# Patient Record
Sex: FEMALE | Race: WHITE | Employment: FULL TIME | URBAN - METROPOLITAN AREA
[De-identification: names, ages, dates, MRNs, and addresses within clinical notes are randomized per-mention and may not be internally consistent; named-entity substitution may affect disease eponyms.]

---

## 2017-01-11 ENCOUNTER — OFFICE VISIT (OUTPATIENT)
Dept: FAMILY MEDICINE CLINIC | Age: 72
End: 2017-01-11

## 2017-01-11 VITALS
TEMPERATURE: 97.5 F | BODY MASS INDEX: 47.01 KG/M2 | WEIGHT: 249 LBS | RESPIRATION RATE: 12 BRPM | HEIGHT: 61 IN | DIASTOLIC BLOOD PRESSURE: 79 MMHG | HEART RATE: 55 BPM | SYSTOLIC BLOOD PRESSURE: 146 MMHG

## 2017-01-11 DIAGNOSIS — R79.9 ABNORMAL FINDING OF BLOOD CHEMISTRY: ICD-10-CM

## 2017-01-11 DIAGNOSIS — R60.0 EDEMA EXTREMITIES: ICD-10-CM

## 2017-01-11 DIAGNOSIS — R76.8 ELEVATED IGE LEVEL: ICD-10-CM

## 2017-01-11 DIAGNOSIS — E66.1 MORBID DRUG-INDUCED OBESITY: ICD-10-CM

## 2017-01-11 DIAGNOSIS — I51.89 DIASTOLIC DYSFUNCTION: ICD-10-CM

## 2017-01-11 DIAGNOSIS — R29.898 HAND DYSFUNCTION: ICD-10-CM

## 2017-01-11 DIAGNOSIS — G47.33 OBSTRUCTIVE APNEA: ICD-10-CM

## 2017-01-11 DIAGNOSIS — R06.81 BREATHLESSNESS ON EXERTION: ICD-10-CM

## 2017-01-11 DIAGNOSIS — L29.9 ITCHING: ICD-10-CM

## 2017-01-11 DIAGNOSIS — R60.9 EDEMA, UNSPECIFIED TYPE: Primary | ICD-10-CM

## 2017-01-11 DIAGNOSIS — R21 RASH: ICD-10-CM

## 2017-01-11 DIAGNOSIS — K90.49 FOOD INTOLERANCE: ICD-10-CM

## 2017-01-11 DIAGNOSIS — N90.7 CYST OF VULVA: ICD-10-CM

## 2017-01-11 LAB
ANION GAP SERPL CALCULATED.3IONS-SCNC: 14 MEQ/L (ref 8–16)
ANISOCYTOSIS: ABNORMAL
AVERAGE GLUCOSE: 105 MG/DL (ref 70–126)
BASOPHILS # BLD: 0.7 %
BASOPHILS ABSOLUTE: 0.1 THOU/MM3 (ref 0–0.1)
BUN BLDV-MCNC: 18 MG/DL (ref 7–22)
CALCIUM SERPL-MCNC: 10.5 MG/DL (ref 8.5–10.5)
CHLORIDE BLD-SCNC: 102 MEQ/L (ref 98–111)
CHOLESTEROL, TOTAL: 161 MG/DL (ref 100–199)
CO2: 27 MEQ/L (ref 23–33)
CREAT SERPL-MCNC: 1.1 MG/DL (ref 0.4–1.2)
EOSINOPHIL # BLD: 4.5 %
EOSINOPHILS ABSOLUTE: 0.3 THOU/MM3 (ref 0–0.4)
ESTRADIOL LEVEL: 21.5 PG/ML
GFR SERPL CREATININE-BSD FRML MDRD: 49 ML/MIN/1.73M2
GLUCOSE BLD-MCNC: 108 MG/DL (ref 70–108)
HBA1C MFR BLD: 5.5 % (ref 4.4–6.4)
HCT VFR BLD CALC: 36.5 % (ref 37–47)
HDLC SERPL-MCNC: 75 MG/DL
HEMOGLOBIN: 12.1 GM/DL (ref 12–16)
LDL CHOLESTEROL CALCULATED: 70 MG/DL
LYMPHOCYTES # BLD: 24 %
LYMPHOCYTES ABSOLUTE: 1.8 THOU/MM3 (ref 1–4.8)
MAGNESIUM: 2 MG/DL (ref 1.6–2.4)
MCH RBC QN AUTO: 30.1 PG (ref 27–31)
MCHC RBC AUTO-ENTMCNC: 33 GM/DL (ref 33–37)
MCV RBC AUTO: 91.1 FL (ref 81–99)
MONOCYTES # BLD: 7.7 %
MONOCYTES ABSOLUTE: 0.6 THOU/MM3 (ref 0.4–1.3)
NUCLEATED RED BLOOD CELLS: 0 /100 WBC
PDW BLD-RTO: 14.6 % (ref 11.5–14.5)
PLATELET # BLD: 244 THOU/MM3 (ref 130–400)
PMV BLD AUTO: 11.3 MCM (ref 7.4–10.4)
POTASSIUM SERPL-SCNC: 4.2 MEQ/L (ref 3.5–5.2)
PTH INTACT: 16.9 PG/ML (ref 15–65)
RBC # BLD: 4.01 MILL/MM3 (ref 4.2–5.4)
RBC # BLD: NORMAL 10*6/UL
RHEUMATOID FACTOR: < 10 IU/ML (ref 0–13)
SEDIMENTATION RATE, ERYTHROCYTE: 50 MM/HR (ref 0–20)
SEG NEUTROPHILS: 63.1 %
SEGMENTED NEUTROPHILS ABSOLUTE COUNT: 4.7 THOU/MM3 (ref 1.8–7.7)
SODIUM BLD-SCNC: 143 MEQ/L (ref 135–145)
T3 TOTAL: 115 NG/DL (ref 72–181)
TRIGL SERPL-MCNC: 78 MG/DL (ref 0–199)
TSH SERPL DL<=0.05 MIU/L-ACNC: 2.51 MCIU/ML (ref 0.4–4.2)
VITAMIN D 25-HYDROXY: 28 NG/ML (ref 30–100)
WBC # BLD: 7.5 THOU/MM3 (ref 4.8–10.8)

## 2017-01-11 PROCEDURE — 99214 OFFICE O/P EST MOD 30 MIN: CPT | Performed by: FAMILY MEDICINE

## 2017-01-11 ASSESSMENT — ENCOUNTER SYMPTOMS: ROS SKIN COMMENTS: ITCHING

## 2017-01-12 LAB
IGE: 587 IU/ML
THYROXINE (T4): 10.3 UG/DL (ref 4.5–12)

## 2017-01-13 LAB
C-REACTIVE PROTEIN, HIGH SENSITIVITY: 8.5 MG/L
DHEAS (DHEA SULFATE): 91 UG/DL (ref 10–90)
HOMOCYSTEINE, TOTAL: 14 UMOL/L
THYROID PEROXIDASE ANTIBODY: 0.3 IU/ML (ref 0–9)

## 2017-01-14 LAB
ANA SCREEN: NORMAL
DHEA UNCONJUGATED: 1.29 NG/ML (ref 0.63–4.7)
GLIADIN PEPTIDE IGG, IGA: NORMAL
TESTOSTERONE, FREE W SHGB, FEMALES/CHILDREN: NORMAL

## 2017-02-16 ENCOUNTER — PATIENT MESSAGE (OUTPATIENT)
Dept: FAMILY MEDICINE CLINIC | Age: 72
End: 2017-02-16

## 2017-02-16 DIAGNOSIS — M10.00 ACUTE IDIOPATHIC GOUT, UNSPECIFIED SITE: Primary | ICD-10-CM

## 2017-02-16 RX ORDER — ALLOPURINOL 100 MG/1
100 TABLET ORAL DAILY
Qty: 30 TABLET | Refills: 3 | Status: SHIPPED | OUTPATIENT
Start: 2017-02-16 | End: 2017-03-15 | Stop reason: SDUPTHER

## 2017-03-07 ENCOUNTER — PATIENT MESSAGE (OUTPATIENT)
Dept: FAMILY MEDICINE CLINIC | Age: 72
End: 2017-03-07

## 2017-03-14 ENCOUNTER — OFFICE VISIT (OUTPATIENT)
Dept: FAMILY MEDICINE CLINIC | Age: 72
End: 2017-03-14

## 2017-03-14 VITALS
RESPIRATION RATE: 14 BRPM | BODY MASS INDEX: 45.45 KG/M2 | HEART RATE: 57 BPM | SYSTOLIC BLOOD PRESSURE: 157 MMHG | TEMPERATURE: 97.4 F | DIASTOLIC BLOOD PRESSURE: 80 MMHG | HEIGHT: 62 IN | WEIGHT: 247 LBS

## 2017-03-14 DIAGNOSIS — G47.33 OBSTRUCTIVE APNEA: ICD-10-CM

## 2017-03-14 DIAGNOSIS — R60.0 EDEMA EXTREMITIES: ICD-10-CM

## 2017-03-14 DIAGNOSIS — M10.00 ACUTE IDIOPATHIC GOUT, UNSPECIFIED SITE: ICD-10-CM

## 2017-03-14 DIAGNOSIS — I51.89 DIASTOLIC DYSFUNCTION: ICD-10-CM

## 2017-03-14 DIAGNOSIS — R60.0 LOCALIZED EDEMA: ICD-10-CM

## 2017-03-14 DIAGNOSIS — R06.81 BREATHLESSNESS ON EXERTION: ICD-10-CM

## 2017-03-14 DIAGNOSIS — K90.49 FOOD INTOLERANCE: ICD-10-CM

## 2017-03-14 DIAGNOSIS — N90.7 CYST OF VULVA: ICD-10-CM

## 2017-03-14 DIAGNOSIS — R21 RASH: ICD-10-CM

## 2017-03-14 DIAGNOSIS — R76.8 ELEVATED IGE LEVEL: ICD-10-CM

## 2017-03-14 DIAGNOSIS — R29.898 HAND DYSFUNCTION: ICD-10-CM

## 2017-03-14 DIAGNOSIS — E66.01 MORBID OBESITY DUE TO EXCESS CALORIES (HCC): ICD-10-CM

## 2017-03-14 DIAGNOSIS — L29.9 ITCHING: ICD-10-CM

## 2017-03-14 LAB
ANION GAP SERPL CALCULATED.3IONS-SCNC: 14 MEQ/L (ref 8–16)
ANISOCYTOSIS: ABNORMAL
BASOPHILS # BLD: 1.3 %
BASOPHILS ABSOLUTE: 0.1 THOU/MM3 (ref 0–0.1)
BUN BLDV-MCNC: 15 MG/DL (ref 7–22)
CALCIUM SERPL-MCNC: 10.1 MG/DL (ref 8.5–10.5)
CHLORIDE BLD-SCNC: 100 MEQ/L (ref 98–111)
CO2: 27 MEQ/L (ref 23–33)
CREAT SERPL-MCNC: 1.1 MG/DL (ref 0.4–1.2)
EOSINOPHIL # BLD: 2.9 %
EOSINOPHILS ABSOLUTE: 0.2 THOU/MM3 (ref 0–0.4)
ESTRADIOL LEVEL: 13.2 PG/ML
GFR SERPL CREATININE-BSD FRML MDRD: 49 ML/MIN/1.73M2
GLUCOSE BLD-MCNC: 107 MG/DL (ref 70–108)
HCT VFR BLD CALC: 38.1 % (ref 37–47)
HEMOGLOBIN: 12.8 GM/DL (ref 12–16)
LYMPHOCYTES # BLD: 23 %
LYMPHOCYTES ABSOLUTE: 1.7 THOU/MM3 (ref 1–4.8)
MAGNESIUM: 2.3 MG/DL (ref 1.6–2.4)
MCH RBC QN AUTO: 30.9 PG (ref 27–31)
MCHC RBC AUTO-ENTMCNC: 33.5 GM/DL (ref 33–37)
MCV RBC AUTO: 92.2 FL (ref 81–99)
MONOCYTES # BLD: 7.9 %
MONOCYTES ABSOLUTE: 0.6 THOU/MM3 (ref 0.4–1.3)
NUCLEATED RED BLOOD CELLS: 0 /100 WBC
PDW BLD-RTO: 14.8 % (ref 11.5–14.5)
PLATELET # BLD: 204 THOU/MM3 (ref 130–400)
PMV BLD AUTO: 11 MCM (ref 7.4–10.4)
POTASSIUM SERPL-SCNC: 4.1 MEQ/L (ref 3.5–5.2)
PTH INTACT: 37.3 PG/ML (ref 15–65)
RBC # BLD: 4.14 MILL/MM3 (ref 4.2–5.4)
RBC # BLD: NORMAL 10*6/UL
SEDIMENTATION RATE, ERYTHROCYTE: 33 MM/HR (ref 0–20)
SEG NEUTROPHILS: 64.9 %
SEGMENTED NEUTROPHILS ABSOLUTE COUNT: 4.9 THOU/MM3 (ref 1.8–7.7)
SODIUM BLD-SCNC: 141 MEQ/L (ref 135–145)
URIC ACID: 6.7 MG/DL (ref 2.4–5.7)
VITAMIN D 25-HYDROXY: 37 NG/ML (ref 30–100)
WBC # BLD: 7.6 THOU/MM3 (ref 4.8–10.8)

## 2017-03-14 PROCEDURE — G8484 FLU IMMUNIZE NO ADMIN: HCPCS | Performed by: FAMILY MEDICINE

## 2017-03-14 PROCEDURE — 3014F SCREEN MAMMO DOC REV: CPT | Performed by: FAMILY MEDICINE

## 2017-03-14 PROCEDURE — G8400 PT W/DXA NO RESULTS DOC: HCPCS | Performed by: FAMILY MEDICINE

## 2017-03-14 PROCEDURE — 1036F TOBACCO NON-USER: CPT | Performed by: FAMILY MEDICINE

## 2017-03-14 PROCEDURE — 1090F PRES/ABSN URINE INCON ASSESS: CPT | Performed by: FAMILY MEDICINE

## 2017-03-14 PROCEDURE — 3017F COLORECTAL CA SCREEN DOC REV: CPT | Performed by: FAMILY MEDICINE

## 2017-03-14 PROCEDURE — 1123F ACP DISCUSS/DSCN MKR DOCD: CPT | Performed by: FAMILY MEDICINE

## 2017-03-14 PROCEDURE — G8417 CALC BMI ABV UP PARAM F/U: HCPCS | Performed by: FAMILY MEDICINE

## 2017-03-14 PROCEDURE — G8427 DOCREV CUR MEDS BY ELIG CLIN: HCPCS | Performed by: FAMILY MEDICINE

## 2017-03-14 PROCEDURE — 99214 OFFICE O/P EST MOD 30 MIN: CPT | Performed by: FAMILY MEDICINE

## 2017-03-14 PROCEDURE — 4040F PNEUMOC VAC/ADMIN/RCVD: CPT | Performed by: FAMILY MEDICINE

## 2017-03-15 DIAGNOSIS — M10.00 ACUTE IDIOPATHIC GOUT, UNSPECIFIED SITE: ICD-10-CM

## 2017-03-15 LAB
IGE: 481 IU/ML
IGG: 748 MG/DL (ref 700–1600)

## 2017-03-15 RX ORDER — ALLOPURINOL 100 MG/1
200 TABLET ORAL DAILY
Qty: 60 TABLET | Refills: 3 | Status: SHIPPED | OUTPATIENT
Start: 2017-03-15 | End: 2017-05-17 | Stop reason: SDUPTHER

## 2017-03-16 LAB
C-REACTIVE PROTEIN, HIGH SENSITIVITY: 11.4 MG/L
DHEAS (DHEA SULFATE): 70 UG/DL (ref 10–90)
HOMOCYSTEINE, TOTAL: 14 UMOL/L

## 2017-03-17 LAB
ANTI SSA: NORMAL
ANTI SSB: 0 AU/ML (ref 0–40)
ANTI-SMITH: 0 AU/ML (ref 0–40)
DHEA UNCONJUGATED: 1.05 NG/ML (ref 0.63–4.7)
GLIADIN PEPTIDE IGG, IGA: NORMAL
TESTOSTERONE, FREE W SHGB, FEMALES/CHILDREN: NORMAL

## 2017-05-17 ENCOUNTER — OFFICE VISIT (OUTPATIENT)
Dept: FAMILY MEDICINE CLINIC | Age: 72
End: 2017-05-17

## 2017-05-17 VITALS
SYSTOLIC BLOOD PRESSURE: 140 MMHG | BODY MASS INDEX: 45.75 KG/M2 | WEIGHT: 248.6 LBS | RESPIRATION RATE: 12 BRPM | DIASTOLIC BLOOD PRESSURE: 70 MMHG | HEIGHT: 62 IN | HEART RATE: 67 BPM | TEMPERATURE: 98 F

## 2017-05-17 DIAGNOSIS — E78.5 LIPIDS SERUM INCREASED: ICD-10-CM

## 2017-05-17 DIAGNOSIS — E55.9 VITAMIN D DEFICIENCY: ICD-10-CM

## 2017-05-17 DIAGNOSIS — M10.00 ACUTE IDIOPATHIC GOUT, UNSPECIFIED SITE: ICD-10-CM

## 2017-05-17 DIAGNOSIS — M10.061 ACUTE IDIOPATHIC GOUT OF RIGHT KNEE: Primary | ICD-10-CM

## 2017-05-17 DIAGNOSIS — R60.0 LOCALIZED EDEMA: ICD-10-CM

## 2017-05-17 DIAGNOSIS — M25.561 RIGHT KNEE PAIN, UNSPECIFIED CHRONICITY: ICD-10-CM

## 2017-05-17 LAB
ANISOCYTOSIS: ABNORMAL
BASOPHILS # BLD: 1.2 %
BASOPHILS ABSOLUTE: 0.1 THOU/MM3 (ref 0–0.1)
CALCIUM SERPL-MCNC: 10.1 MG/DL (ref 8.5–10.5)
EOSINOPHIL # BLD: 3.2 %
EOSINOPHILS ABSOLUTE: 0.2 THOU/MM3 (ref 0–0.4)
HCT VFR BLD CALC: 37.1 % (ref 37–47)
HEMOGLOBIN: 12.3 GM/DL (ref 12–16)
LYMPHOCYTES # BLD: 21.9 %
LYMPHOCYTES ABSOLUTE: 1.4 THOU/MM3 (ref 1–4.8)
MAGNESIUM: 2.2 MG/DL (ref 1.6–2.4)
MCH RBC QN AUTO: 30 PG (ref 27–31)
MCHC RBC AUTO-ENTMCNC: 33 GM/DL (ref 33–37)
MCV RBC AUTO: 90.7 FL (ref 81–99)
MONOCYTES # BLD: 7 %
MONOCYTES ABSOLUTE: 0.5 THOU/MM3 (ref 0.4–1.3)
NUCLEATED RED BLOOD CELLS: 0 /100 WBC
PDW BLD-RTO: 15.2 % (ref 11.5–14.5)
PLATELET # BLD: 202 THOU/MM3 (ref 130–400)
PMV BLD AUTO: 10.9 MCM (ref 7.4–10.4)
PTH INTACT: 42.4 PG/ML (ref 15–65)
RBC # BLD: 4.09 MILL/MM3 (ref 4.2–5.4)
RBC # BLD: NORMAL 10*6/UL
RHEUMATOID FACTOR: < 10 IU/ML (ref 0–13)
SEDIMENTATION RATE, ERYTHROCYTE: 46 MM/HR (ref 0–20)
SEG NEUTROPHILS: 66.7 %
SEGMENTED NEUTROPHILS ABSOLUTE COUNT: 4.3 THOU/MM3 (ref 1.8–7.7)
URIC ACID: 5.7 MG/DL (ref 2.4–5.7)
VITAMIN D 25-HYDROXY: 42 NG/ML (ref 30–100)
WBC # BLD: 6.5 THOU/MM3 (ref 4.8–10.8)

## 2017-05-17 PROCEDURE — 36415 COLL VENOUS BLD VENIPUNCTURE: CPT | Performed by: FAMILY MEDICINE

## 2017-05-17 PROCEDURE — G8417 CALC BMI ABV UP PARAM F/U: HCPCS | Performed by: FAMILY MEDICINE

## 2017-05-17 PROCEDURE — G8427 DOCREV CUR MEDS BY ELIG CLIN: HCPCS | Performed by: FAMILY MEDICINE

## 2017-05-17 PROCEDURE — 3014F SCREEN MAMMO DOC REV: CPT | Performed by: FAMILY MEDICINE

## 2017-05-17 PROCEDURE — 3017F COLORECTAL CA SCREEN DOC REV: CPT | Performed by: FAMILY MEDICINE

## 2017-05-17 PROCEDURE — 1090F PRES/ABSN URINE INCON ASSESS: CPT | Performed by: FAMILY MEDICINE

## 2017-05-17 PROCEDURE — 1036F TOBACCO NON-USER: CPT | Performed by: FAMILY MEDICINE

## 2017-05-17 PROCEDURE — 1123F ACP DISCUSS/DSCN MKR DOCD: CPT | Performed by: FAMILY MEDICINE

## 2017-05-17 PROCEDURE — G8400 PT W/DXA NO RESULTS DOC: HCPCS | Performed by: FAMILY MEDICINE

## 2017-05-17 PROCEDURE — 4040F PNEUMOC VAC/ADMIN/RCVD: CPT | Performed by: FAMILY MEDICINE

## 2017-05-17 PROCEDURE — 99214 OFFICE O/P EST MOD 30 MIN: CPT | Performed by: FAMILY MEDICINE

## 2017-05-17 RX ORDER — ALLOPURINOL 100 MG/1
200 TABLET ORAL DAILY
Qty: 60 TABLET | Refills: 3 | Status: SHIPPED | OUTPATIENT
Start: 2017-05-17 | End: 2017-08-17 | Stop reason: SDUPTHER

## 2017-05-17 ASSESSMENT — PATIENT HEALTH QUESTIONNAIRE - PHQ9
2. FEELING DOWN, DEPRESSED OR HOPELESS: 0
SUM OF ALL RESPONSES TO PHQ9 QUESTIONS 1 & 2: 0
SUM OF ALL RESPONSES TO PHQ QUESTIONS 1-9: 0
1. LITTLE INTEREST OR PLEASURE IN DOING THINGS: 0

## 2017-05-18 ENCOUNTER — TELEPHONE (OUTPATIENT)
Dept: FAMILY MEDICINE CLINIC | Age: 72
End: 2017-05-18

## 2017-05-18 DIAGNOSIS — M25.561 RIGHT KNEE PAIN, UNSPECIFIED CHRONICITY: Primary | ICD-10-CM

## 2017-05-18 LAB
C-REACTIVE PROTEIN, HIGH SENSITIVITY: 10.3 MG/L
DHEAS (DHEA SULFATE): 83 UG/DL (ref 10–90)
HOMOCYSTEINE, TOTAL: 15 UMOL/L

## 2017-05-19 LAB
ANA SCREEN: NORMAL
DHEA UNCONJUGATED: 1.24 NG/ML (ref 0.63–4.7)
GLIADIN PEPTIDE IGG, IGA: NORMAL

## 2017-05-22 ENCOUNTER — TELEPHONE (OUTPATIENT)
Dept: FAMILY MEDICINE CLINIC | Age: 72
End: 2017-05-22

## 2017-06-01 ENCOUNTER — TELEPHONE (OUTPATIENT)
Dept: FAMILY MEDICINE CLINIC | Age: 72
End: 2017-06-01

## 2017-07-17 ENCOUNTER — TELEPHONE (OUTPATIENT)
Dept: FAMILY MEDICINE CLINIC | Age: 72
End: 2017-07-17

## 2017-07-27 ENCOUNTER — PATIENT MESSAGE (OUTPATIENT)
Dept: FAMILY MEDICINE CLINIC | Age: 72
End: 2017-07-27

## 2017-08-01 ENCOUNTER — TELEPHONE (OUTPATIENT)
Dept: FAMILY MEDICINE CLINIC | Age: 72
End: 2017-08-01

## 2017-08-01 DIAGNOSIS — R76.8 ELEVATED IGE LEVEL: ICD-10-CM

## 2017-08-01 DIAGNOSIS — L29.9 ITCHING: Primary | ICD-10-CM

## 2017-08-01 DIAGNOSIS — K90.49 FOOD INTOLERANCE: ICD-10-CM

## 2017-08-04 ENCOUNTER — APPOINTMENT (OUTPATIENT)
Dept: GENERAL RADIOLOGY | Age: 72
End: 2017-08-04
Payer: MEDICARE

## 2017-08-04 ENCOUNTER — HOSPITAL ENCOUNTER (EMERGENCY)
Age: 72
Discharge: HOME | End: 2017-08-04
Payer: MEDICARE

## 2017-08-04 VITALS
BODY MASS INDEX: 46.26 KG/M2 | RESPIRATION RATE: 16 BRPM | HEIGHT: 61 IN | DIASTOLIC BLOOD PRESSURE: 53 MMHG | TEMPERATURE: 98 F | SYSTOLIC BLOOD PRESSURE: 144 MMHG | WEIGHT: 245 LBS | OXYGEN SATURATION: 99 % | HEART RATE: 64 BPM

## 2017-08-04 DIAGNOSIS — M25.411: Primary | ICD-10-CM

## 2017-08-04 PROCEDURE — 99283 EMERGENCY DEPT VISIT LOW MDM: CPT

## 2017-08-04 PROCEDURE — 73030 X-RAY EXAM OF SHOULDER: CPT

## 2017-08-04 RX ORDER — ALBUTEROL SULFATE 90 UG/1
2 AEROSOL, METERED RESPIRATORY (INHALATION) PRN
COMMUNITY

## 2017-08-04 RX ORDER — CIPROFLOXACIN 500 MG/1
500 TABLET, FILM COATED ORAL 2 TIMES DAILY
COMMUNITY
End: 2017-11-17 | Stop reason: ALTCHOICE

## 2017-08-04 ASSESSMENT — ENCOUNTER SYMPTOMS
ABDOMINAL PAIN: 0
COUGH: 0
CHEST TIGHTNESS: 0
SHORTNESS OF BREATH: 0
COLOR CHANGE: 0

## 2017-08-04 ASSESSMENT — PAIN DESCRIPTION - DESCRIPTORS: DESCRIPTORS: ACHING

## 2017-08-04 ASSESSMENT — PAIN DESCRIPTION - FREQUENCY: FREQUENCY: CONTINUOUS

## 2017-08-04 ASSESSMENT — PAIN DESCRIPTION - LOCATION: LOCATION: SHOULDER

## 2017-08-04 ASSESSMENT — PAIN SCALES - GENERAL: PAINLEVEL_OUTOF10: 5

## 2017-08-04 ASSESSMENT — PAIN DESCRIPTION - PAIN TYPE: TYPE: ACUTE PAIN

## 2017-08-04 ASSESSMENT — PAIN DESCRIPTION - ORIENTATION: ORIENTATION: RIGHT

## 2017-08-17 ENCOUNTER — OFFICE VISIT (OUTPATIENT)
Dept: FAMILY MEDICINE CLINIC | Age: 72
End: 2017-08-17
Payer: MEDICARE

## 2017-08-17 VITALS
BODY MASS INDEX: 44.05 KG/M2 | HEART RATE: 58 BPM | HEIGHT: 63 IN | SYSTOLIC BLOOD PRESSURE: 136 MMHG | WEIGHT: 248.6 LBS | TEMPERATURE: 98.2 F | RESPIRATION RATE: 12 BRPM | DIASTOLIC BLOOD PRESSURE: 62 MMHG

## 2017-08-17 DIAGNOSIS — M25.561 RIGHT KNEE PAIN, UNSPECIFIED CHRONICITY: ICD-10-CM

## 2017-08-17 DIAGNOSIS — R21 RASH: ICD-10-CM

## 2017-08-17 DIAGNOSIS — G47.33 OBSTRUCTIVE APNEA: ICD-10-CM

## 2017-08-17 DIAGNOSIS — E66.01 MORBID OBESITY DUE TO EXCESS CALORIES (HCC): ICD-10-CM

## 2017-08-17 DIAGNOSIS — R60.0 EDEMA EXTREMITIES: ICD-10-CM

## 2017-08-17 DIAGNOSIS — K90.49 FOOD INTOLERANCE: ICD-10-CM

## 2017-08-17 DIAGNOSIS — R06.81 BREATHLESSNESS ON EXERTION: ICD-10-CM

## 2017-08-17 DIAGNOSIS — L29.9 ITCHING: ICD-10-CM

## 2017-08-17 DIAGNOSIS — R29.898 HAND DYSFUNCTION: ICD-10-CM

## 2017-08-17 DIAGNOSIS — R76.8 ELEVATED IGE LEVEL: ICD-10-CM

## 2017-08-17 DIAGNOSIS — I51.89 DIASTOLIC DYSFUNCTION: ICD-10-CM

## 2017-08-17 DIAGNOSIS — N90.7 CYST OF VULVA: ICD-10-CM

## 2017-08-17 DIAGNOSIS — M10.00 ACUTE IDIOPATHIC GOUT, UNSPECIFIED SITE: ICD-10-CM

## 2017-08-17 DIAGNOSIS — R73.09 HIGH GLUCOSE: Primary | ICD-10-CM

## 2017-08-17 DIAGNOSIS — R60.0 LOCALIZED EDEMA: ICD-10-CM

## 2017-08-17 LAB
ANION GAP SERPL CALCULATED.3IONS-SCNC: 13 MEQ/L (ref 8–16)
ANISOCYTOSIS: ABNORMAL
AVERAGE GLUCOSE: 117 MG/DL (ref 70–126)
BASOPHILS # BLD: 0.4 %
BASOPHILS ABSOLUTE: 0 THOU/MM3 (ref 0–0.1)
BUN BLDV-MCNC: 13 MG/DL (ref 7–22)
CALCIUM SERPL-MCNC: 9.7 MG/DL (ref 8.5–10.5)
CHLORIDE BLD-SCNC: 103 MEQ/L (ref 98–111)
CHOLESTEROL, TOTAL: 167 MG/DL (ref 100–199)
CO2: 26 MEQ/L (ref 23–33)
CREAT SERPL-MCNC: 0.9 MG/DL (ref 0.4–1.2)
EOSINOPHIL # BLD: 3.1 %
EOSINOPHILS ABSOLUTE: 0.2 THOU/MM3 (ref 0–0.4)
ESTRADIOL LEVEL: 15.8 PG/ML
GFR SERPL CREATININE-BSD FRML MDRD: 61 ML/MIN/1.73M2
GLUCOSE BLD-MCNC: 107 MG/DL (ref 70–108)
HBA1C MFR BLD: 5.9 % (ref 4.4–6.4)
HBV SURFACE AB TITR SER: NEGATIVE {TITER}
HCT VFR BLD CALC: 34.7 % (ref 37–47)
HDLC SERPL-MCNC: 81 MG/DL
HEMOGLOBIN: 11.8 GM/DL (ref 12–16)
HEPATITIS B SURFACE ANTIGEN: NEGATIVE
HEPATITIS C ANTIBODY: NEGATIVE
IGE: 203 IU/ML
LDL CHOLESTEROL CALCULATED: 71 MG/DL
LYMPHOCYTES # BLD: 17.3 %
LYMPHOCYTES ABSOLUTE: 1.2 THOU/MM3 (ref 1–4.8)
MAGNESIUM: 1.9 MG/DL (ref 1.6–2.4)
MCH RBC QN AUTO: 31.4 PG (ref 27–31)
MCHC RBC AUTO-ENTMCNC: 34.1 GM/DL (ref 33–37)
MCV RBC AUTO: 91.9 FL (ref 81–99)
MONOCYTES # BLD: 5.1 %
MONOCYTES ABSOLUTE: 0.4 THOU/MM3 (ref 0.4–1.3)
NUCLEATED RED BLOOD CELLS: 0 /100 WBC
PDW BLD-RTO: 15.8 % (ref 11.5–14.5)
PLATELET # BLD: 215 THOU/MM3 (ref 130–400)
PMV BLD AUTO: 10.8 MCM (ref 7.4–10.4)
POTASSIUM SERPL-SCNC: 4.2 MEQ/L (ref 3.5–5.2)
PROGESTERONE LEVEL: < 0.05 NG/ML
PTH INTACT: 35.9 PG/ML (ref 15–65)
RBC # BLD: 3.77 MILL/MM3 (ref 4.2–5.4)
RBC # BLD: NORMAL 10*6/UL
RHEUMATOID FACTOR: < 10 IU/ML (ref 0–13)
SEDIMENTATION RATE, ERYTHROCYTE: 58 MM/HR (ref 0–20)
SEG NEUTROPHILS: 74.1 %
SEGMENTED NEUTROPHILS ABSOLUTE COUNT: 5.1 THOU/MM3 (ref 1.8–7.7)
SODIUM BLD-SCNC: 142 MEQ/L (ref 135–145)
T3 TOTAL: 101 NG/DL (ref 72–181)
THYROXINE (T4): 9.9 UG/DL (ref 4.5–12)
TRIGL SERPL-MCNC: 76 MG/DL (ref 0–199)
TSH SERPL DL<=0.05 MIU/L-ACNC: 1.92 UIU/ML (ref 0.4–4.2)
URIC ACID: 5.1 MG/DL (ref 2.4–5.7)
VITAMIN D 25-HYDROXY: 59 NG/ML (ref 30–100)
WBC # BLD: 6.9 THOU/MM3 (ref 4.8–10.8)

## 2017-08-17 PROCEDURE — G8427 DOCREV CUR MEDS BY ELIG CLIN: HCPCS | Performed by: FAMILY MEDICINE

## 2017-08-17 PROCEDURE — 99214 OFFICE O/P EST MOD 30 MIN: CPT | Performed by: FAMILY MEDICINE

## 2017-08-17 PROCEDURE — G8400 PT W/DXA NO RESULTS DOC: HCPCS | Performed by: FAMILY MEDICINE

## 2017-08-17 PROCEDURE — 3017F COLORECTAL CA SCREEN DOC REV: CPT | Performed by: FAMILY MEDICINE

## 2017-08-17 PROCEDURE — 1036F TOBACCO NON-USER: CPT | Performed by: FAMILY MEDICINE

## 2017-08-17 PROCEDURE — 3014F SCREEN MAMMO DOC REV: CPT | Performed by: FAMILY MEDICINE

## 2017-08-17 PROCEDURE — 4040F PNEUMOC VAC/ADMIN/RCVD: CPT | Performed by: FAMILY MEDICINE

## 2017-08-17 PROCEDURE — 1090F PRES/ABSN URINE INCON ASSESS: CPT | Performed by: FAMILY MEDICINE

## 2017-08-17 PROCEDURE — G8417 CALC BMI ABV UP PARAM F/U: HCPCS | Performed by: FAMILY MEDICINE

## 2017-08-17 PROCEDURE — 36415 COLL VENOUS BLD VENIPUNCTURE: CPT | Performed by: FAMILY MEDICINE

## 2017-08-17 PROCEDURE — 1123F ACP DISCUSS/DSCN MKR DOCD: CPT | Performed by: FAMILY MEDICINE

## 2017-08-17 RX ORDER — ALLOPURINOL 100 MG/1
200 TABLET ORAL DAILY
Qty: 60 TABLET | Refills: 3 | Status: SHIPPED | OUTPATIENT
Start: 2017-08-17 | End: 2018-04-25 | Stop reason: SDUPTHER

## 2017-08-17 RX ORDER — MULTIVIT WITH MIN/MFOLATE/K2 340-15/3 G
1 POWDER (GRAM) ORAL
COMMUNITY
End: 2017-11-17 | Stop reason: DRUGHIGH

## 2017-08-18 LAB
C-REACTIVE PROTEIN, HIGH SENSITIVITY: 6.8 MG/L
DHEAS (DHEA SULFATE): 79 UG/DL (ref 10–90)
HOMOCYSTEINE, TOTAL: 12 UMOL/L

## 2017-08-19 LAB
ANA SCREEN: NORMAL
DHEA UNCONJUGATED: 1.11 NG/ML (ref 0.63–4.7)
GLIADIN PEPTIDE IGG, IGA: NORMAL
TESTOSTERONE, FREE W SHGB, FEMALES/CHILDREN: NORMAL
THYROID PEROXIDASE ANTIBODY: 0.5 IU/ML (ref 0–9)

## 2017-08-20 LAB — TESTOSTERONE, FREE W SHGB, FEMALES/CHILDREN: NORMAL

## 2017-10-22 ENCOUNTER — PATIENT MESSAGE (OUTPATIENT)
Dept: FAMILY MEDICINE CLINIC | Age: 72
End: 2017-10-22

## 2017-10-31 DIAGNOSIS — M25.561 RIGHT KNEE PAIN, UNSPECIFIED CHRONICITY: ICD-10-CM

## 2017-10-31 DIAGNOSIS — M10.00 ACUTE IDIOPATHIC GOUT, UNSPECIFIED SITE: ICD-10-CM

## 2017-10-31 DIAGNOSIS — R29.898 HAND DYSFUNCTION: ICD-10-CM

## 2017-11-17 ENCOUNTER — OFFICE VISIT (OUTPATIENT)
Dept: FAMILY MEDICINE CLINIC | Age: 72
End: 2017-11-17
Payer: MEDICARE

## 2017-11-17 VITALS
RESPIRATION RATE: 20 BRPM | HEART RATE: 58 BPM | SYSTOLIC BLOOD PRESSURE: 136 MMHG | BODY MASS INDEX: 44.72 KG/M2 | HEIGHT: 63 IN | WEIGHT: 252.4 LBS | TEMPERATURE: 97.8 F | DIASTOLIC BLOOD PRESSURE: 82 MMHG

## 2017-11-17 DIAGNOSIS — M25.561 RIGHT KNEE PAIN, UNSPECIFIED CHRONICITY: ICD-10-CM

## 2017-11-17 DIAGNOSIS — M10.00 ACUTE IDIOPATHIC GOUT, UNSPECIFIED SITE: ICD-10-CM

## 2017-11-17 DIAGNOSIS — R21 RASH: ICD-10-CM

## 2017-11-17 DIAGNOSIS — R76.8 ELEVATED IGE LEVEL: ICD-10-CM

## 2017-11-17 DIAGNOSIS — I51.89 DIASTOLIC DYSFUNCTION: ICD-10-CM

## 2017-11-17 DIAGNOSIS — R60.0 EDEMA EXTREMITIES: ICD-10-CM

## 2017-11-17 DIAGNOSIS — R60.1 GENERALIZED EDEMA: ICD-10-CM

## 2017-11-17 DIAGNOSIS — R06.81 BREATHLESSNESS ON EXERTION: ICD-10-CM

## 2017-11-17 DIAGNOSIS — G47.33 OBSTRUCTIVE APNEA: ICD-10-CM

## 2017-11-17 DIAGNOSIS — L29.9 ITCHING: ICD-10-CM

## 2017-11-17 DIAGNOSIS — K90.49 FOOD INTOLERANCE: ICD-10-CM

## 2017-11-17 DIAGNOSIS — R29.898 HAND DYSFUNCTION: ICD-10-CM

## 2017-11-17 DIAGNOSIS — E66.01 MORBID OBESITY (HCC): ICD-10-CM

## 2017-11-17 DIAGNOSIS — R73.09 HIGH GLUCOSE: ICD-10-CM

## 2017-11-17 DIAGNOSIS — N90.7 CYST OF VULVA: ICD-10-CM

## 2017-11-17 LAB
ANISOCYTOSIS: ABNORMAL
AVERAGE GLUCOSE: 102 MG/DL (ref 70–126)
BASOPHILS # BLD: 0.8 %
BASOPHILS ABSOLUTE: 0.1 THOU/MM3 (ref 0–0.1)
CALCIUM SERPL-MCNC: 9.4 MG/DL (ref 8.5–10.5)
EOSINOPHIL # BLD: 6.1 %
EOSINOPHILS ABSOLUTE: 0.4 THOU/MM3 (ref 0–0.4)
ESTRADIOL LEVEL: 19.3 PG/ML
FOLATE: > 20 NG/ML (ref 4.8–24.2)
FOLLICLE STIMULATING HORMONE: 57.4 MIU/ML (ref 16–160)
HBA1C MFR BLD: 5.4 % (ref 4.4–6.4)
HCT VFR BLD CALC: 35.6 % (ref 37–47)
HEMOGLOBIN: 11.6 GM/DL (ref 12–16)
LUTEINIZING HORMONE: 32.6 MIU/ML (ref 3.3–70.6)
LYMPHOCYTES # BLD: 18.8 %
LYMPHOCYTES ABSOLUTE: 1.3 THOU/MM3 (ref 1–4.8)
MAGNESIUM: 2.1 MG/DL (ref 1.6–2.4)
MCH RBC QN AUTO: 30.1 PG (ref 27–31)
MCHC RBC AUTO-ENTMCNC: 32.7 GM/DL (ref 33–37)
MCV RBC AUTO: 92.2 FL (ref 81–99)
MONOCYTES # BLD: 7.2 %
MONOCYTES ABSOLUTE: 0.5 THOU/MM3 (ref 0.4–1.3)
NUCLEATED RED BLOOD CELLS: 0 /100 WBC
PDW BLD-RTO: 16 % (ref 11.5–14.5)
PLATELET # BLD: 195 THOU/MM3 (ref 130–400)
PMV BLD AUTO: 11.9 MCM (ref 7.4–10.4)
PROGESTERONE LEVEL: < 0.05 NG/ML
PTH INTACT: 49.4 PG/ML (ref 15–65)
RBC # BLD: 3.86 MILL/MM3 (ref 4.2–5.4)
RHEUMATOID FACTOR: < 10 IU/ML (ref 0–13)
SEDIMENTATION RATE, ERYTHROCYTE: 36 MM/HR (ref 0–20)
SEG NEUTROPHILS: 67.1 %
SEGMENTED NEUTROPHILS ABSOLUTE COUNT: 4.6 THOU/MM3 (ref 1.8–7.7)
T3 TOTAL: 102 NG/DL (ref 72–181)
TSH SERPL DL<=0.05 MIU/L-ACNC: 1.71 UIU/ML (ref 0.4–4.2)
VITAMIN B-12: 1856 PG/ML (ref 211–911)
VITAMIN D 25-HYDROXY: 49 NG/ML (ref 30–100)
WBC # BLD: 6.8 THOU/MM3 (ref 4.8–10.8)

## 2017-11-17 PROCEDURE — G8427 DOCREV CUR MEDS BY ELIG CLIN: HCPCS | Performed by: FAMILY MEDICINE

## 2017-11-17 PROCEDURE — 1090F PRES/ABSN URINE INCON ASSESS: CPT | Performed by: FAMILY MEDICINE

## 2017-11-17 PROCEDURE — G8400 PT W/DXA NO RESULTS DOC: HCPCS | Performed by: FAMILY MEDICINE

## 2017-11-17 PROCEDURE — 36415 COLL VENOUS BLD VENIPUNCTURE: CPT | Performed by: FAMILY MEDICINE

## 2017-11-17 PROCEDURE — 3014F SCREEN MAMMO DOC REV: CPT | Performed by: FAMILY MEDICINE

## 2017-11-17 PROCEDURE — 1036F TOBACCO NON-USER: CPT | Performed by: FAMILY MEDICINE

## 2017-11-17 PROCEDURE — 99214 OFFICE O/P EST MOD 30 MIN: CPT | Performed by: FAMILY MEDICINE

## 2017-11-17 PROCEDURE — 3017F COLORECTAL CA SCREEN DOC REV: CPT | Performed by: FAMILY MEDICINE

## 2017-11-17 PROCEDURE — 1123F ACP DISCUSS/DSCN MKR DOCD: CPT | Performed by: FAMILY MEDICINE

## 2017-11-17 PROCEDURE — G8417 CALC BMI ABV UP PARAM F/U: HCPCS | Performed by: FAMILY MEDICINE

## 2017-11-17 PROCEDURE — 4040F PNEUMOC VAC/ADMIN/RCVD: CPT | Performed by: FAMILY MEDICINE

## 2017-11-17 PROCEDURE — G8484 FLU IMMUNIZE NO ADMIN: HCPCS | Performed by: FAMILY MEDICINE

## 2017-11-17 RX ORDER — MULTIVIT WITH MIN/MFOLATE/K2 340-15/3 G
2000 POWDER (GRAM) ORAL DAILY
COMMUNITY
End: 2018-07-25

## 2017-11-17 NOTE — PROGRESS NOTES
Subjective:      Patient ID: Marlis Schirmer is a 66 y.o. female. HPI   Marlis Schirmer is a 66 y.o. White female. Hong Donald  presents to the [employer] today for   Chief Complaint   Patient presents with    3 Month Follow-Up     WEADIS review labs completed 08/17/2017    Shoulder Pain     Right shoulder pain seen in [address] [place] [address] ED 08/04/2017    Other     sores in mouth started 2 years ago patient did have a biopsy of gums    Knee Pain     Right knee better at this time and was told she has tears behind bilateral knee caps   ,  and;   Diastolic dysfunction    Breathlessness on exertion    Edema extremities    Morbid obesity (HCC)    Obstructive apnea    Cyst of vulva    Elevated IgE level    Food intolerance    Generalized edema    Rash    Itching    Hand dysfunction    Acute idiopathic gout, unspecified site    Right knee pain, unspecified chronicity    High glucose      I have reviewed Marlis Schirmer medical, surgical and other pertinent history in detail, and have updated medication and allergy information in the computerized patient record. Clinical Care Team:     -Referring Provider for today's consult: Self Referred  -Primary Care Provider: Hayley Brennan MD    Medical/Surgical History:   She  has a past medical history of Acid reflux disease; Anemia; Asthma; Gout; HTN (hypertension); Irregular heart rate; and Obstructive sleep apnea. Her  has a past surgical history that includes Tonsillectomy (1955); Tubal ligation (1980); Dilation and curettage of uterus (11/08/1985); Facial cosmetic surgery (1990); Endometrial biopsy (06/14/1995); knee surgery (Right, 05/1997); other surgical history (Left, 11/2007); colectomy (10/15/2009); other surgical history (12/17/1995); and Hysterectomy, total abdominal (01/2005).     Family/Social History:     Her family history includes Cancer in her mother; Heart Disease in her father, mother, and sister; High Blood Pressure in her father and mother; Kidney Disease in her mother. She  reports that she is a non-smoker but has been exposed to tobacco smoke. She has never used smokeless tobacco. She reports that she does not drink alcohol or use drugs. Medications/Allergies/Immunizations:     Her current medication(s) include   Current Outpatient Prescriptions:     Methylcobalamin 5000 MCG TBDP, Place under the tongue daily , Disp: , Rfl:     allopurinol (ZYLOPRIM) 100 MG tablet, Take 2 tablets by mouth daily, Disp: 60 tablet, Rfl: 3    albuterol sulfate  (90 Base) MCG/ACT inhaler, Inhale 2 puffs into the lungs as needed for Wheezing, Disp: , Rfl:     diltiazem (DILACOR XR) 180 MG extended release capsule, Take 180 mg by mouth daily, Disp: , Rfl:     nebivolol (BYSTOLIC) 5 MG tablet, Take 5 mg by mouth daily, Disp: , Rfl:     lisinopril (PRINIVIL;ZESTRIL) 20 MG tablet, Take 20 mg by mouth daily, Disp: , Rfl:     Levomefolic Acid (5-MTHF PO), Take 10 mg by mouth every morning (before breakfast) , Disp: , Rfl:     Cholecalciferol (VITAMIN D3) 5000 UNITS CAPS, Take 1 capsule by mouth every morning (before breakfast) Double Sunday, Disp: , Rfl:     B COMPLEX VITAMINS SL, Place under the tongue 4 times daily 1 dropper full , Disp: , Rfl:   Allergies: Alternaria; Clindamycin/lincomycin; Clonazepam; Dandelion [taraxacum officinale]; Dust mite extract; Epinephrine; Erythrocin [erythromycin]; Lansoprazole; Molds & smuts; Penicillins; Seasonal; Sulfa antibiotics; and Tetracyclines & related,  Immunizations: There is no immunization history on file for this patient. History of Present Illness:     Mattie's had concerns including 3 Month Follow-Up (WEE review labs completed 08/17/2017); Shoulder Pain (Right shoulder pain seen in [address] [place] [address] ED 08/04/2017); Other (sores in mouth started 2 years ago patient did have a biopsy of gums); and Knee Pain (Right knee better at this time and was told she has tears behind bilateral knee caps). Alie Kirkland  presents to the visit; Chief Complaint   Patient presents with    3 Month Follow-Up     CYRILE review labs completed 08/17/2017    Shoulder Pain     Right shoulder pain seen in [facility] ED 08/04/2017    Other     sores in mouth started 2 years ago patient did have a biopsy of gums    Knee Pain     Right knee better at this time and was told she has tears behind bilateral knee caps   ; Improved items at this visit; Stable items at this visit;  2. Patients food and drinks were reviewed with the patient,       - Lonell Brilliant will bring food+drink symptom log to next visit for inclusion in their record      - 75 better food list reviewed & given to patient with the omega 6 food list to avoid         - Gluten in corn and oats abstracts sheet reviewed and given to the patient today   3. Greater than 25 minutes were spent face to face on this visit of which >50% was for counseling and coordination of care. Patients food and drinks were reviewed with the patient,   - they will bring a food drink symptom log to future visits for inclusion in their record    - 75 better food list reviewed & given to patient along with the omega 6 food list to avoid      - Gluten in corn and oats abstracts sheet reviewed and given to the patient today    - 23 Foods containing Latex-like proteins was reviewed and copy to be taken if desired     - Nutrient Supplements list provided and copy to be taken if desired    - [employer] web site offered to patient to review at their convenience by staff with login information    Note:   I have discussed with the patient that with all nutraceuticals, there is often mixed data and emerging research which needs to be monitored; as well as an array of NIH fact sheets on nutrients and supplements. If I have recommended cinnamon at the request of this patient to assist them in control of their blood sugar, triglyceride and or weight issues.   I discussed that the patient's clinical use of cinnamon bark, calcium, magnesium, Vitamin D and pharmaceutical grade CVS #290337 fish oil or triple-strength fish oil, and B-75 two phase time-released B complex by [facility] will be for a time-limited trial to determine their individual effectiveness and safety in this patient. I also referred the patient to the NMCD: Nutrition, Metabolism, and Cardiovascular Diseases (journal) and concerns about long-term use and hepatotoxicity of cinnamon and other nutrients and suggest they frequently search nih.gov for the latest non-proprietary information on nutriceuticals as well as consider a subscription to [employer] for details on reviewed supplements, or at the least review the nutrient files at [address] at [facility], [address] [place] [address]     Cinnamon bark, an insulin mimetic, reduces some High Carbohydrate Dietary Impacts. Methylhydroxychalcone polymers insulin-enhancing properties in fat cells are responsible for enhanced glucose uptake, inhibiting hepatic HMG-CoA reductase and lowers lipids. www.jacn. org/content/20/4/327.full     But cinnamon with additives such as Chromium or Cinnamon Extract are not effective as insulin mimetics.  https://www.alvarez.net/     Nutrients for Start up from [employer] or [address] for ease to get started now ;  Lisbeth Khan has some useable products;  - Triple Strength Fish Oil, enteric coated  - Vit D 3 5000 IU gel caps  - Iron ferrous sulfat 325 mg tabs  - Centrum Silver look-a-like for most patients, or  - Centrum plain look-a-like if need iron    Local pharmacies or chains such as [facility], [employer], have;  - Triple Strength Fish Oil (enteric coated if available) or    If not enteric coated, can take from freezer for less burps  - B-50 or B-100 time released balanced B complex tabs  - Cinnamon bark 500 mg (without Chromium or extracts)   some brands list 1000 mg / serving of 2 capsules,    some brands have 1000 mg caps with the undesireable chromium / extract  - Calcium carbonate/citrate, magnesium oxide/citrate, Vit D 3  as 3-4 tabs/caps/serving     Some Local Brands may contain Zinc which is acceptable for the first bottle or two  - Magnesium oxide 250 mg tabs for those having < 2 bowel movements daily  - Magnesium citrate 200 mg if having > 2 bowel movement/day  - Centrum Silver or look-a-like for most patients, Centrum plain or look-a-like with iron  - Vitamin D-3 comes as 1,000 IU or 2,000 IU or 5,000 IU gel caps or Liquid drops      Some brands containing or derived from soy oil or corn oil are OK if not allergic to soy  - Elemental Iron 65 mg tabs at bedtime is available over the counter if need more iron     Usually turns bowel movements grey, green or black but not a concern  - Apricot Kernel Oil (by Now) for dry skin sensitive perineal or perianal area skin    Nutrients for ongoing use by Mail order for less expense from www. puritan.com ;  - Triple Strength Fish Oil , 240 Softgels Item [id number]  - B-100 time released balanced B complex Item #[id number]  - Cinnamon bark 500 mg without Chromium or extract Item #610645  - Calcium carbonate 1000 mg, Magnesium oxide 500 mg, Vit D 3  400 IU Item #[id number]  - Magnesium oxide 500 mg tabs Item #[id number] if less than 2 bowel movements daily  - [employer] Item #[id number] for most patients, One Daily Item #159571 with iron  - Vit D 3  1,000 Item #[id number]      2,000 IU Item #[id number]         5,000 IU Item #[id number]     Some brands containing or derived from soy oil or corn oil are OK if not allergic to soy    Nutrients for Special Needs by Mail order for less expense from www. puritan.com ;  - Elemental Iron 65 mg tabs Item #982090 if need more iron for low iron on labs    Usually turns bowel movements grey, green or black but not a concern  - Time released Niacin 250 mg Item #157980 for cold intolerance, low libido or impotence  - DHEA 50 mg Item #563168 for improving DHEA levels on labs if Banana, Celery, Figs, and Kiwi always contain Latex-like protein. Whats in Season? Strawberries taste better in June than December because June is strawberry season so buy locally grown produce \"in season\" for the best flavor, cost and less Latex. Locally grown produce not only tastes great requires little of no ethylene exposure in food distribution so has less latex content. Out of season, use canned, frozen or dried since processed ripe and are latex lower!!!   Month     [place] Locally Grown Produce  January, February, March: use canned, frozen or dried fruits since lower in latex  April; asparagus, radishes  May; asparagus, broccoli, green onions, greens, peas, radishes, rhubarb  June; asparagus, beets, beans, broccoli, cabbage, cantaloupe, carrots, green onions, greens, lettuce, onions, parsley, peas, radishes, rhubarb, strawberries, watermelons  July; beans, beets, blueberries, broccoli, cabbage, cantaloupe, carrots, cauliflower, celery, cucumbers, eggplant, grapes, green onions, greens, lettuce, onions, parsley, peas, peaches, bell peppers, potatoes, radishes, summer raspberries, squash, sweet corn, tomatoes, turnips, watermelons  August; apples, beans, beets, blueberries, cabbage, cantaloupe, carrots, cauliflower, celery, cucumbers, eggplant, grapes, green onions, greens, lettuce, onions, parsley, peas, peaches, pears, bell peppers, potatoes, radishes, squash, sweet corn, tomatoes, turnips, watermelons  September; apples, beans, beets, blueberries, cabbage, cantaloupe, carrots, cauliflower, celery, cucumbers, eggplant, grapes, green onions, greens, lettuce, onions, parsley, peas, peaches, pears, bell peppers, plums, potatoes, pumpkins, radishes, fall red raspberries, squash, sweet corn, tomatoes, turnips, watermelons  October; apples, beets, broccoli, cabbage, carrots, cauliflower, celery, green onions, greens, lettuce, parsley, peas, pears, potatoes, pumpkins, radishes, fall red raspberries, squash, turnips  November; broccoli, cabbage, carrots, parsley, pears, peas  December: use canned, frozen or dried fruits since lower in latex    Up to half of latex-sensitive patients show allergic reactions to fruits (avocados, bananas, kiwifruits, papayas, peaches),   Annals of Allergy, 1994. These plants contain the same proteins that are allergens in latex. People with fruit allergies should warn physicians before undergoing procedures which may cause anaphylactic reaction if in contact with latex gloves. Some of the common foods with defined cross-reactivity to latex are avocado, banana, kiwi, chestnut, raw potato, tomato, stone fruits (e.g., peach, cherry), hazelnut, melons, celery, carrot, apple, pear, papaya, and almond. Foods with less well-defined cross-reactivity to latex are peanuts, peppers, citrus fruits, coconut, pineapple, so, fig, passion fruit, Ugli fruit, and grape    This fruit/latex cross-reactivity is worsened by ethylene, a gas used to hasten commercial ripening. In nature, plants produce low levels of the hormone ethylene, which regulates germination, flowering, and ripening. Forced ripening by high ethylene concentrations, plants produce allergenic wound-repair proteins, which are similar to wound-repair proteins made during the tapping of rubber trees. Sensitive individuals who ingest the fruit get a higher dose and worse reaction. Some people may even first become sensitized to latex through fruit. Can food processing increase the concentrations of allergenic proteins? Latex-sensitized children (and adults) in [place] often experience allergic reactions after eating bananas ripened artificially with ethylene. In the [place], food distribution centers treat unripe bananas and other produce with ethylene to ripen; not commonly done in [facility] since fruit is tree-ripened there. Does treatment of food with ethylene induce banana proteins that cross-react with latex?

## 2017-11-18 LAB
IGE: 201 IU/ML
T3 FREE: 2.62 PG/ML (ref 2.02–4.43)
THYROXINE (T4): 9 UG/DL (ref 4.5–12)

## 2017-11-18 NOTE — LETTER
symptom log when you come in to discuss in more detail this letter's explanation for why you can benefit from adding or changing each item. Life changes are not easy, but We do understand the complexity of altering your symptom generating diet to the more healthful Wee BERTRAM-2 based 75 Better Health Foods which are gluten, carrageenan, latex free, as well as glycemic controlled. We are fortunate to have Michael Donald, our CNP, Dr Tash Antonioing Dr Maddy Levi MD, as my colleagues and myself available to assist you in developing a change plan for Better Health in 120 days and beyond    Your Options from these labs to improve your health over the next 120 days are;    Since your [place] stabilize your entire metabolic system: For your PTH to reach our goal of less than 15;:  Lab Results   Component Value Date    IPTH 49.4 11/17/2017    Which = ~ 34.4 % bone resorption,we can adjust the relative levels of Vitamin D, Calcium and Magnesium by lowering highest and/or raising the lowest ones as follows; For your Vitamin D to get into the 50-60 range:  Lab Results   Component Value Date    VITD25 49 11/17/2017    You can add 1,000 IU daily of Vit D-3     For your Calcium to get into the 9.5-9.6 range:  Lab Results   Component Value Date    CALCIUM 9.4 11/17/2017    You can add 1 calcium intakes (tabs/day or foods)    For your Magnesium to get into the 2.3-2.4 range:  Lab Results   Component Value Date    MG 2.1 11/17/2017    You can add 2 more magnesium TABLETS per day. Your choice of magnesium type is based on how your bowels are moving currently;   so if bowels are;  · If sensitive to magnesium or already having 2 or more movements per day, use magnesium gluconate or  Slow Mag, both available from [facility], [facility], [employer]. · If extremely sensitive to magnesium or having more than 3-4 movements per day you can try mgplusprotein tabs (go to www.mgplusprotein. com for a Note: I have noticed recently that B-75 by [facility] does not clear the CrP nor raise HDL so phase off it to the slower absorbed B-50(TR) or B-100(TR) tabs. We may use B-75 two-phase cap by [employer] plus a B-50 (TR) if you are having more than 5 movements per day since it dissolves faster due to more surface area if your body temperature is less than 98.6 Fahrenheit to reach a CrP of 0.00 and or HDL cholesterol >60. For lowering your A1c,Triglycerides, low blood sugar symptoms and weight:   Lab Results   Component Value Date    LABA1C 5.4 11/17/2017     Lab Results   Component Value Date    TRIG 76 08/17/2017    For these, you are good    You can consider the Tamie Ferreira protocol which benefits immune / bone concerns;  1) add 500 mg of L-lysine 4 times a day and   2) add 1000 mg of extended release Vitamin C before meals and at bedtime, &  3) add a K2 capsule twice a day, but do not take Vit K-2 if on warfarin or thinners  (all available at [facility])    To reverse Inflammation Related to Omega 6 Plant oils from seeds, nuts, poultry: For your Monocytes, Eosinophils, RDW, and Platelets stability, you can;  Lab Results   Component Value Date    MONOPCT 7.2 11/17/2017     Lab Results   Component Value Date    LABEOS 6.1 11/17/2017     Lab Results   Component Value Date    RDW 16.0 11/17/2017     Lab Results   Component Value Date     11/17/2017   You can add 1 CVS #183063 cap or Triple strength fish oil before each meal (mealtimes) and at bedtime to reduce your Monocyte % by 1 % to get to the <6% range and or Eosinophil % (allergies) to <2%. Do not add if on warfarin or thinners, but remove the offending foods    For best results remove all seeds, nuts, flax, soy, avocado, hummus, granola, poultry from your diet to reduce the plant oils injury; see www.efaeducation. org for why    Search how each of your foods reacts from very good to awful in your body at http://jcghm0tzblje. com/  Flax and Brian seeds are Not Healthful so AVOID these    Unless on thinners,if monocyte% is several %s above 6%, you can raise the fish oil in sets of 4 caps/day each week or so for a couple of raises, then call for new CBC diff lab. To Improve Thyroid Functions related to Grains(gluten),Carrageenan, Latex foods; For your TSH, T3,T4, and Thyroid Peroxidase(TPO) which are related to Gliadin IGA/IGG = % immune/ % bowel damage from grains, carrageenan in beer, juices and dairy products, and Latex-like proteins in foods:  Lab Results   Component Value Date    TSH 1.710 11/17/2017     Lab Results   Component Value Date    U3UZCAM 102 11/17/2017   Removing grains from the diet improves thyroid gland functioning, bowel health, and auto-immune tests results as well as preventing grain brain, wheat belly and so many more symptoms / conditions. To Improve Auto-Immune related Tests related to Grains, Latex and Plant oil in our diet; For your Sedimentation Rate, Rheumatoid Factor, and LUCAS titer:  Lab Results   Component Value Date    SEDRATE 36 11/17/2017     Lab Results   Component Value Date    RF < 10 11/17/2017   Which improve(s) by removing grains, latex-like protein foods, and excess plant oil foods such as seeds, nuts, flax, soy, avocado, hummus, granola, and poultry. Other Non-Optimal Lab results to review on your next visit; Reminder; On-line Classes on Nutritional Principles, Removing Toxic Foods, Celiac/ Gluten / Gliadin, 75 Foods for Better Health, Carrageenan, Natamycin and other toxic food additives have been video taped and are now available 24/7 on line to you at [email or web address] once you activate your FREE login and password. To access these classes, call [phone] for login and password    Lab Review Visit is needed before I can write future orders;     These are your early lab results so review and to send questions or come

## 2017-11-19 LAB
C-REACTIVE PROTEIN, HIGH SENSITIVITY: 9.5 MG/L
DHEAS (DHEA SULFATE): 79 UG/DL (ref 10–90)
THYROID PEROXIDASE ANTIBODY: 0.5 IU/ML (ref 0–9)

## 2017-11-20 LAB
ANA SCREEN: DETECTED
ANTI-SMITH: 0 AU/ML (ref 0–40)
GLIADIN PEPTIDE IGG, IGA: NORMAL

## 2017-11-21 LAB
ANA SCREEN, REFLEXED: ABNORMAL
DHEA UNCONJUGATED: 1.17 NG/ML (ref 0.63–4.7)
HOMOCYSTEINE, TOTAL: 11 UMOL/L
TESTOSTERONE, FREE W SHGB, FEMALES/CHILDREN: NORMAL

## 2017-11-23 NOTE — ADDENDUM NOTE
Encounter addended by: Justino Hodgkins, MD on: 11/23/2017 12:35 PM<BR>    Actions taken: Letter status changed

## 2017-12-19 ENCOUNTER — OFFICE VISIT (OUTPATIENT)
Dept: FAMILY MEDICINE CLINIC | Age: 72
End: 2017-12-19
Payer: MEDICARE

## 2017-12-19 VITALS
WEIGHT: 249.6 LBS | DIASTOLIC BLOOD PRESSURE: 88 MMHG | TEMPERATURE: 98.2 F | HEIGHT: 62 IN | HEART RATE: 78 BPM | SYSTOLIC BLOOD PRESSURE: 138 MMHG | BODY MASS INDEX: 45.93 KG/M2 | RESPIRATION RATE: 16 BRPM

## 2017-12-19 DIAGNOSIS — N90.7 CYST OF VULVA: ICD-10-CM

## 2017-12-19 DIAGNOSIS — R60.0 EDEMA EXTREMITIES: ICD-10-CM

## 2017-12-19 DIAGNOSIS — R76.8 ELEVATED IGE LEVEL: ICD-10-CM

## 2017-12-19 DIAGNOSIS — E66.01 MORBID OBESITY (HCC): ICD-10-CM

## 2017-12-19 DIAGNOSIS — R60.0 LOCALIZED EDEMA: ICD-10-CM

## 2017-12-19 DIAGNOSIS — R06.81 BREATHLESSNESS ON EXERTION: ICD-10-CM

## 2017-12-19 DIAGNOSIS — I51.89 DIASTOLIC DYSFUNCTION: ICD-10-CM

## 2017-12-19 DIAGNOSIS — K90.49 FOOD INTOLERANCE: ICD-10-CM

## 2017-12-19 DIAGNOSIS — L29.9 ITCHING: ICD-10-CM

## 2017-12-19 DIAGNOSIS — R29.898 HAND DYSFUNCTION: ICD-10-CM

## 2017-12-19 DIAGNOSIS — R21 RASH: ICD-10-CM

## 2017-12-19 DIAGNOSIS — M25.561 RIGHT KNEE PAIN, UNSPECIFIED CHRONICITY: ICD-10-CM

## 2017-12-19 DIAGNOSIS — G47.33 OBSTRUCTIVE APNEA: ICD-10-CM

## 2017-12-19 DIAGNOSIS — M10.00 ACUTE IDIOPATHIC GOUT, UNSPECIFIED SITE: ICD-10-CM

## 2017-12-19 DIAGNOSIS — R73.09 HIGH GLUCOSE: ICD-10-CM

## 2017-12-19 PROCEDURE — G8484 FLU IMMUNIZE NO ADMIN: HCPCS | Performed by: FAMILY MEDICINE

## 2017-12-19 PROCEDURE — G8400 PT W/DXA NO RESULTS DOC: HCPCS | Performed by: FAMILY MEDICINE

## 2017-12-19 PROCEDURE — G8417 CALC BMI ABV UP PARAM F/U: HCPCS | Performed by: FAMILY MEDICINE

## 2017-12-19 PROCEDURE — 1123F ACP DISCUSS/DSCN MKR DOCD: CPT | Performed by: FAMILY MEDICINE

## 2017-12-19 PROCEDURE — 99214 OFFICE O/P EST MOD 30 MIN: CPT | Performed by: FAMILY MEDICINE

## 2017-12-19 PROCEDURE — 3014F SCREEN MAMMO DOC REV: CPT | Performed by: FAMILY MEDICINE

## 2017-12-19 PROCEDURE — 1090F PRES/ABSN URINE INCON ASSESS: CPT | Performed by: FAMILY MEDICINE

## 2017-12-19 PROCEDURE — 3017F COLORECTAL CA SCREEN DOC REV: CPT | Performed by: FAMILY MEDICINE

## 2017-12-19 PROCEDURE — G8427 DOCREV CUR MEDS BY ELIG CLIN: HCPCS | Performed by: FAMILY MEDICINE

## 2017-12-19 PROCEDURE — 1036F TOBACCO NON-USER: CPT | Performed by: FAMILY MEDICINE

## 2017-12-19 PROCEDURE — 4040F PNEUMOC VAC/ADMIN/RCVD: CPT | Performed by: FAMILY MEDICINE

## 2017-12-19 ASSESSMENT — ENCOUNTER SYMPTOMS: BACK PAIN: 1

## 2017-12-19 NOTE — PROGRESS NOTES
Subjective:      Patient ID: Gabby Thompson is a 74 y.o. female. HPI   Gabby Thompson is a 74 y.o. White female. Melvin Duarte  presents to the [employer] today for   Chief Complaint   Patient presents with    1 Month Follow-Up     WEE- is very confused on what to take.  Other     saw a rheumatolgoist and was prescribed medication but she hasn't taken it.  Other     recently had a biopsy and hasn't recieved the results. ,  and;   High glucose    Right knee pain, unspecified chronicity    Acute idiopathic gout, unspecified site    Localized edema    Rash    Itching    Hand dysfunction    Elevated IgE level    Food intolerance    Cyst of vulva    Edema extremities    Obstructive apnea    Diastolic dysfunction    Breathlessness on exertion    Morbid obesity ([address]      I have reviewed Gabby Thompson medical, surgical and other pertinent history in detail, and have updated medication and allergy information in the computerized patient record. Clinical Care Team:     -Referring Provider for today's consult: Self Referred  -Primary Care Provider: Fátima Fletcher MD    Medical/Surgical History:   She  has a past medical history of Acid reflux disease; Anemia; Asthma; Gout; HTN (hypertension); Irregular heart rate; and Obstructive sleep apnea. Her  has a past surgical history that includes Tonsillectomy (1955); Tubal ligation (1980); Dilation and curettage of uterus (11/08/1985); Facial cosmetic surgery (1990); Endometrial biopsy (06/14/1995); knee surgery (Right, 05/1997); other surgical history (Left, 11/2007); colectomy (10/15/2009); other surgical history (12/17/1995); and Hysterectomy, total abdominal (01/2005). Family/Social History:     Her family history includes Cancer in her mother; Heart Disease in her father, mother, and sister; High Blood Pressure in her father and mother; Kidney Disease in her mother.   She  reports that she is a non-smoker but has been exposed to tobacco smoke. She has never used smokeless tobacco. She reports that she does not drink alcohol or use drugs. Medications/Allergies/Immunizations:     Her current medication(s) include   Current Outpatient Prescriptions:     B Complex-Folic Acid (D-937 BALANCED TR PO), Take 0.5 tablets by mouth 3 times daily (before meals) Crush in apple sauce, Disp: , Rfl:     Methylcobalamin 5000 MCG TBDP, Place 2,000 mcg under the tongue daily , Disp: , Rfl:     B COMPLEX VITAMINS SL, Place under the tongue 4 times daily 1 dropper full , Disp: , Rfl:     allopurinol (ZYLOPRIM) 100 MG tablet, Take 2 tablets by mouth daily, Disp: 60 tablet, Rfl: 3    albuterol sulfate  (90 Base) MCG/ACT inhaler, Inhale 2 puffs into the lungs as needed for Wheezing, Disp: , Rfl:     diltiazem (DILACOR XR) 180 MG extended release capsule, Take 180 mg by mouth daily, Disp: , Rfl:     nebivolol (BYSTOLIC) 5 MG tablet, Take 5 mg by mouth daily, Disp: , Rfl:     lisinopril (PRINIVIL;ZESTRIL) 20 MG tablet, Take 20 mg by mouth daily, Disp: , Rfl:     Levomefolic Acid (5-MTHF PO), Take 5 mg by mouth every morning (before breakfast) , Disp: , Rfl:     Cholecalciferol (VITAMIN D3) 5000 UNITS CAPS, Take 6,000 Units by mouth every morning (before breakfast) , Disp: , Rfl:   Allergies: Alternaria; Clindamycin/lincomycin; Clonazepam; Dandelion [taraxacum officinale]; Dust mite extract; Epinephrine; Erythrocin [erythromycin]; Lansoprazole; Molds & smuts; Penicillins; Seasonal; Sulfa antibiotics; and Tetracyclines & related,  Immunizations: There is no immunization history on file for this patient. History of Present Illness:     Mattie's had concerns including 1 Month Follow-Up (WEE- is very confused on what to take. ); Other (saw a rheumatolgoist and was prescribed medication but she hasn't taken it.); and Other (recently had a biopsy and hasn't recieved the results. ). Kimberly Dugan Rater  presents to the [facility] today for;   Chief Complaint   Patient presents with    1 Month Follow-Up     WEE- is very confused on what to take.  Other     saw a rheumatolgoist and was prescribed medication but she hasn't taken it.  Other     recently had a biopsy and hasn't recieved the results. , ,  abnormal labs follow up and these conditions as she  Is looking today for:     High glucose    Right knee pain, unspecified chronicity    Acute idiopathic gout, unspecified site    Localized edema    Rash    Itching    Hand dysfunction    Elevated IgE level    Food intolerance    Cyst of vulva    Edema extremities    Obstructive apnea    Diastolic dysfunction    Breathlessness on exertion    Morbid obesity ([address]          Review of Systems   HENT: Positive for dental problem and mouth sores. Musculoskeletal: Positive for arthralgias, back pain, gait problem, joint swelling and myalgias. All other systems reviewed and are negative. Objective:   Physical Exam   Constitutional: She is oriented to person, place, and time. She appears well-developed and well-nourished. HENT:   Head: Normocephalic. Pulmonary/Chest: Effort normal.   Neurological: She is alert and oriented to person, place, and time. Psychiatric: She has a normal mood and affect. Thought content normal.   Nursing note and vitals reviewed. Assessment:      Laboratory Data:   Lab results were searched in Care Everywhere and/or those brought by the pateint were reviewed today with Brittaney Hernandez and she has a copy of their most recent labs to take home with them as noted below;       Imaging Data:   Imaging Data:       Assessment & Plan:       Impression:  1. High glucose    2. Right knee pain, unspecified chronicity    3. Acute idiopathic gout, unspecified site    4. Localized edema    5. Rash    6. Itching    7. Hand dysfunction    8. Elevated IgE level    9. Food intolerance    10. Cyst of vulva    11. Edema extremities    12. Obstructive apnea    13.  Diastolic dysfunction    14. Breathlessness on exertion    15. Morbid obesity ([address]      Assessment and Plan:  After reviewing the patients chief complaints, reviewing their lab findings in great detail (with the patient and those accompanying them) which correlate to their chief complaints, symptoms, and or medical conditions; suggestions were made relating to changes in diet and or supplements which may improve the complaints and which will be reflected in their future lab findings; Chief Complaint   Patient presents with    1 Month Follow-Up     WEE- is very confused on what to take.  Other     saw a rheumatolgoist and was prescribed medication but she hasn't taken it.  Other     recently had a biopsy and hasn't recieved the results.   ;    Plans for the next visits:  - Abnormal and non-optimal Labs were ordered today to be repeated in the next 120-365 days to assess changes from adjustments in nutrition and or nutrients. - Patient instructed when having a blood draw to ask the  to divide their lab draws into multiple draws over several days if not feeling good at the time of the lab draw or if either prefers to do several smaller blood draws over several days  - Patient instructed to check with insurer before each lab draw and to go to the lab which the insurer directs them for the most cost effective lab draw with the least patient's cost  - Gisel Jama  will be scheduled subsequent to those results. Anitra Kala will bring in her drink and food log to her next visit    Chronic Problems Addressed on this Visit:                                   1.  Intensity of Service; Uncontrolled items at this visit; Chief Complaint   Patient presents with    1 Month Follow-Up     WEE- is very confused on what to take.  Other     saw a rheumatolgoist and was prescribed medication but she hasn't taken it.  Other     recently had a biopsy and hasn't recieved the results.    ;              Improved items reviewed at locally: Melon, Nectarine, Papaya, Cherry, Passion fruit, Plum, Chestnuts, and Tomato. Avocado, Banana, Celery, Figs, and Kiwi always contain Latex-like protein and are almost universally toxic    Whats in Season? Strawberries taste better in June than December because June is strawberry season so buy locally grown produce \"in season\" for the best flavor, cost and less Latex. Locally grown produce not only tastes great requires little of no ethylene exposure in food distribution so has less latex content. Out of season, use canned, frozen or dried since processed ripe and are latex lower!!!   Month     [place] Locally Grown Produce  January, February, March: use canned, frozen or dried fruits since lower in latex  April; asparagus, radishes  May; asparagus, broccoli, green onions, greens, peas, radishes, rhubarb  June; asparagus, beets, beans, broccoli, cabbage, cantaloupe, carrots, green onions, greens, lettuce, onions, parsley, peas, radishes, rhubarb, strawberries, watermelons  July; beans, beets, blueberries, broccoli, cabbage, cantaloupe, carrots, cauliflower, celery, cucumbers, eggplant, grapes, green onions, greens, lettuce, onions, parsley, peas, peaches, bell peppers, potatoes, radishes, summer raspberries, squash, sweet corn, tomatoes, turnips, watermelons  August; apples, beans, beets, blueberries, cabbage, cantaloupe, carrots, cauliflower, celery, cucumbers, eggplant, grapes, green onions, greens, lettuce, onions, parsley, peas, peaches, pears, bell peppers, potatoes, radishes, squash, sweet corn, tomatoes, turnips, watermelons  September; apples, beans, beets, blueberries, cabbage, cantaloupe, carrots, cauliflower, celery, cucumbers, eggplant, grapes, green onions, greens, lettuce, onions, parsley, peas, peaches, pears, bell peppers, plums, potatoes, pumpkins, radishes, fall red raspberries, squash, sweet corn, tomatoes, turnips, watermelons  October; apples, beets, broccoli, cabbage, carrots, cauliflower, celery, green onions, greens, lettuce, parsley, peas, pears, potatoes, pumpkins, radishes, fall red raspberries, squash, turnips  November; broccoli, cabbage, carrots, parsley, pears, peas  December: use canned, frozen or dried fruits since lower in latex    Up to half of latex-sensitive patients show allergic reactions to fruits (avocados, bananas, kiwifruits, papayas, peaches),   Annals of Allergy, 1994. These plants contain the same proteins that are allergens in latex. People with fruit allergies should warn physicians before undergoing procedures which may cause anaphylactic reaction if in contact with latex gloves. Some of the common foods with defined cross-reactivity to latex are avocado, banana, kiwi, chestnut, raw potato, tomato, stone fruits (e.g., peach, cherry), hazelnut, melons, celery, carrot, apple, pear, papaya, and almond. Foods with less well-defined cross-reactivity to latex are peanuts, peppers, citrus fruits, coconut, pineapple, so, fig, passion fruit, Ugli fruit, and grape    This fruit/latex cross-reactivity is worsened by ethylene, a gas used to hasten commercial ripening. In nature, plants produce low levels of the hormone ethylene, which regulates germination, flowering, and ripening. Forced ripening by high ethylene concentrations, plants produce allergenic wound-repair proteins, which are similar to wound-repair proteins made during the tapping of rubber trees. Sensitive individuals who ingest the fruit get a higher dose and worse reaction. Some people may even first become sensitized to latex through fruit. Can food processing increase the concentrations of allergenic proteins? Latex-sensitized children (and adults) in [place] often experience allergic reactions after eating bananas ripened artificially with ethylene.  In the [place], food distribution centers treat unripe bananas and other produce with ethylene to ripen; not commonly done in [place]

## 2018-03-26 DIAGNOSIS — M10.00 ACUTE IDIOPATHIC GOUT, UNSPECIFIED SITE: ICD-10-CM

## 2018-03-26 RX ORDER — ALLOPURINOL 100 MG/1
TABLET ORAL
Qty: 60 TABLET | Refills: 3 | OUTPATIENT
Start: 2018-03-26

## 2018-03-26 NOTE — TELEPHONE ENCOUNTER
Last visit- 2017  Next visit- 2018 with Dr. GOLDEN [facility]    Requested Prescriptions     Pending Prescriptions Disp Refills    allopurinol (ZYLOPRIM) 100 MG tablet [Pharmacy Med Name: ALLOPURINOL 100 MG TABLET 100 TAB] 60 tablet 3     Si Fredo Figueredo to help control uric acid levals       Are you willing to keep refilling this for the patient? Was last ordered on 17. Please approve or deny.

## 2018-03-27 NOTE — TELEPHONE ENCOUNTER
Advised pt she needs to  she then proceeded to tell me that her primary doctor is useless. Asked if she has looked for a new pcp, she said its not easy for the location of where she lives. Told her to ask at her local hospital whom is accepting new patients. States she did. Said she is not having any luck, proceeded to go thru all of the medical problems she has been having. She does not understand why he is not going to refill her medication for the gout. She then proceeded to tell me she has a refill left on the bottle but the pharmacy will not fill it. Negin Yanez it's a year old and they will not refill it. Told her he gave the script on 8/17/17 for 60 tablets with 3 refills, so it is not a year old. She got very quite on the phone said she just does not understand why this is such an issue. Negin Otto has been treating her for this and does not understand why he will not refill it. Said she does not understand why I am angry with her. Told her I am not angry, just trying to figure out how I can help. Told her I can call the pharmacy and see why they will not fill the last refill, but that's all I can do. Called and spoke to Inga Rhodes at the pharmacy, he said the refill is ready for her to , but there are no more refills left. Called the patient, told her a refill is ready for  and she can discuss this further with Dr. Kirsty Otto at her appointment on 4/25/18. She voiced understanding and said she would.

## 2018-04-25 ENCOUNTER — OFFICE VISIT (OUTPATIENT)
Dept: FAMILY MEDICINE CLINIC | Age: 73
End: 2018-04-25
Payer: MEDICARE

## 2018-04-25 VITALS
HEART RATE: 60 BPM | BODY MASS INDEX: 47.01 KG/M2 | TEMPERATURE: 97.7 F | HEIGHT: 61 IN | WEIGHT: 249 LBS | DIASTOLIC BLOOD PRESSURE: 70 MMHG | SYSTOLIC BLOOD PRESSURE: 140 MMHG

## 2018-04-25 DIAGNOSIS — R21 RASH: ICD-10-CM

## 2018-04-25 DIAGNOSIS — K90.49 FOOD INTOLERANCE: ICD-10-CM

## 2018-04-25 DIAGNOSIS — R76.8 ELEVATED IGE LEVEL: ICD-10-CM

## 2018-04-25 DIAGNOSIS — I51.89 DIASTOLIC DYSFUNCTION: ICD-10-CM

## 2018-04-25 DIAGNOSIS — R60.9 EDEMA, UNSPECIFIED TYPE: ICD-10-CM

## 2018-04-25 DIAGNOSIS — L29.9 ITCHING: ICD-10-CM

## 2018-04-25 DIAGNOSIS — M25.561 RIGHT KNEE PAIN, UNSPECIFIED CHRONICITY: ICD-10-CM

## 2018-04-25 DIAGNOSIS — R73.09 HIGH GLUCOSE: ICD-10-CM

## 2018-04-25 DIAGNOSIS — R29.898 HAND DYSFUNCTION: ICD-10-CM

## 2018-04-25 DIAGNOSIS — G47.33 OBSTRUCTIVE APNEA: ICD-10-CM

## 2018-04-25 DIAGNOSIS — E66.01 MORBID OBESITY (HCC): ICD-10-CM

## 2018-04-25 DIAGNOSIS — R06.81 BREATHLESSNESS ON EXERTION: ICD-10-CM

## 2018-04-25 DIAGNOSIS — N90.7 CYST OF VULVA: ICD-10-CM

## 2018-04-25 DIAGNOSIS — R60.0 LOCALIZED EDEMA: ICD-10-CM

## 2018-04-25 DIAGNOSIS — M10.00 ACUTE IDIOPATHIC GOUT, UNSPECIFIED SITE: ICD-10-CM

## 2018-04-25 DIAGNOSIS — R60.0 EDEMA EXTREMITIES: ICD-10-CM

## 2018-04-25 LAB
ANISOCYTOSIS: ABNORMAL
BASOPHILS # BLD: 0.8 %
BASOPHILS ABSOLUTE: 0.1 THOU/MM3 (ref 0–0.1)
CALCIUM SERPL-MCNC: 10.3 MG/DL (ref 8.5–10.5)
EOSINOPHIL # BLD: 2.7 %
EOSINOPHILS ABSOLUTE: 0.2 THOU/MM3 (ref 0–0.4)
HCT VFR BLD CALC: 37.8 % (ref 37–47)
HEMOGLOBIN: 12.9 GM/DL (ref 12–16)
LYMPHOCYTES # BLD: 20.2 %
LYMPHOCYTES ABSOLUTE: 1.5 THOU/MM3 (ref 1–4.8)
MAGNESIUM: 2 MG/DL (ref 1.6–2.4)
MCH RBC QN AUTO: 31.6 PG (ref 27–31)
MCHC RBC AUTO-ENTMCNC: 34.1 GM/DL (ref 33–37)
MCV RBC AUTO: 92.8 FL (ref 81–99)
MONOCYTES # BLD: 8.9 %
MONOCYTES ABSOLUTE: 0.7 THOU/MM3 (ref 0.4–1.3)
NUCLEATED RED BLOOD CELLS: 0 /100 WBC
PDW BLD-RTO: 15.4 % (ref 11.5–14.5)
PLATELET # BLD: 220 THOU/MM3 (ref 130–400)
PMV BLD AUTO: 11.8 FL (ref 7.4–10.4)
PTH INTACT: 27.8 PG/ML (ref 15–65)
RBC # BLD: 4.07 MILL/MM3 (ref 4.2–5.4)
SEDIMENTATION RATE, ERYTHROCYTE: 47 MM/HR (ref 0–20)
SEG NEUTROPHILS: 67.4 %
SEGMENTED NEUTROPHILS ABSOLUTE COUNT: 5.1 THOU/MM3 (ref 1.8–7.7)
VITAMIN D 25-HYDROXY: 54 NG/ML (ref 30–100)
WBC # BLD: 7.6 THOU/MM3 (ref 4.8–10.8)

## 2018-04-25 PROCEDURE — 1090F PRES/ABSN URINE INCON ASSESS: CPT | Performed by: FAMILY MEDICINE

## 2018-04-25 PROCEDURE — G8400 PT W/DXA NO RESULTS DOC: HCPCS | Performed by: FAMILY MEDICINE

## 2018-04-25 PROCEDURE — 99214 OFFICE O/P EST MOD 30 MIN: CPT | Performed by: FAMILY MEDICINE

## 2018-04-25 PROCEDURE — 36415 COLL VENOUS BLD VENIPUNCTURE: CPT | Performed by: FAMILY MEDICINE

## 2018-04-25 PROCEDURE — G8427 DOCREV CUR MEDS BY ELIG CLIN: HCPCS | Performed by: FAMILY MEDICINE

## 2018-04-25 PROCEDURE — 1123F ACP DISCUSS/DSCN MKR DOCD: CPT | Performed by: FAMILY MEDICINE

## 2018-04-25 PROCEDURE — 4040F PNEUMOC VAC/ADMIN/RCVD: CPT | Performed by: FAMILY MEDICINE

## 2018-04-25 PROCEDURE — 1036F TOBACCO NON-USER: CPT | Performed by: FAMILY MEDICINE

## 2018-04-25 PROCEDURE — 3017F COLORECTAL CA SCREEN DOC REV: CPT | Performed by: FAMILY MEDICINE

## 2018-04-25 PROCEDURE — G8417 CALC BMI ABV UP PARAM F/U: HCPCS | Performed by: FAMILY MEDICINE

## 2018-04-25 RX ORDER — ALLOPURINOL 100 MG/1
200 TABLET ORAL DAILY
Qty: 60 TABLET | Refills: 3 | Status: SHIPPED | OUTPATIENT
Start: 2018-04-25 | End: 2018-07-25

## 2018-04-25 RX ORDER — BETAMETHASONE DIPROPIONATE 0.05 %
GEL (GRAM) TOPICAL 2 TIMES DAILY
COMMUNITY
Start: 2018-04-18

## 2018-04-25 RX ORDER — TRIAMCINOLONE ACETONIDE 1 MG/G
CREAM TOPICAL PRN
COMMUNITY
Start: 2018-04-18

## 2018-04-25 ASSESSMENT — ENCOUNTER SYMPTOMS
ABDOMINAL PAIN: 1
ABDOMINAL DISTENTION: 1
BACK PAIN: 1

## 2018-04-26 LAB — C-REACTIVE PROTEIN, HIGH SENSITIVITY: 7.9 MG/L

## 2018-04-28 LAB — ANA SCREEN: NORMAL

## 2018-07-25 ENCOUNTER — OFFICE VISIT (OUTPATIENT)
Dept: FAMILY MEDICINE CLINIC | Age: 73
End: 2018-07-25
Payer: MEDICARE

## 2018-07-25 VITALS
HEART RATE: 58 BPM | TEMPERATURE: 97.6 F | RESPIRATION RATE: 12 BRPM | SYSTOLIC BLOOD PRESSURE: 140 MMHG | OXYGEN SATURATION: 98 % | HEIGHT: 61 IN | DIASTOLIC BLOOD PRESSURE: 60 MMHG | WEIGHT: 252.8 LBS | BODY MASS INDEX: 47.73 KG/M2

## 2018-07-25 DIAGNOSIS — R73.09 HIGH GLUCOSE: ICD-10-CM

## 2018-07-25 DIAGNOSIS — R76.8 ELEVATED IGE LEVEL: ICD-10-CM

## 2018-07-25 DIAGNOSIS — R60.1 GENERALIZED EDEMA: ICD-10-CM

## 2018-07-25 DIAGNOSIS — R06.81 BREATHLESSNESS ON EXERTION: ICD-10-CM

## 2018-07-25 DIAGNOSIS — E66.01 MORBID OBESITY (HCC): ICD-10-CM

## 2018-07-25 DIAGNOSIS — R60.0 EDEMA EXTREMITIES: ICD-10-CM

## 2018-07-25 DIAGNOSIS — I51.89 DIASTOLIC DYSFUNCTION: ICD-10-CM

## 2018-07-25 DIAGNOSIS — L29.9 ITCHING: ICD-10-CM

## 2018-07-25 DIAGNOSIS — G47.33 OBSTRUCTIVE APNEA: ICD-10-CM

## 2018-07-25 DIAGNOSIS — R21 RASH: ICD-10-CM

## 2018-07-25 DIAGNOSIS — N90.7 CYST OF VULVA: ICD-10-CM

## 2018-07-25 DIAGNOSIS — M10.00 ACUTE IDIOPATHIC GOUT, UNSPECIFIED SITE: ICD-10-CM

## 2018-07-25 DIAGNOSIS — M25.561 RIGHT KNEE PAIN, UNSPECIFIED CHRONICITY: ICD-10-CM

## 2018-07-25 DIAGNOSIS — R29.898 HAND DYSFUNCTION: ICD-10-CM

## 2018-07-25 DIAGNOSIS — K90.49 FOOD INTOLERANCE: ICD-10-CM

## 2018-07-25 PROCEDURE — 4040F PNEUMOC VAC/ADMIN/RCVD: CPT | Performed by: FAMILY MEDICINE

## 2018-07-25 PROCEDURE — 99214 OFFICE O/P EST MOD 30 MIN: CPT | Performed by: FAMILY MEDICINE

## 2018-07-25 PROCEDURE — 1123F ACP DISCUSS/DSCN MKR DOCD: CPT | Performed by: FAMILY MEDICINE

## 2018-07-25 PROCEDURE — G8427 DOCREV CUR MEDS BY ELIG CLIN: HCPCS | Performed by: FAMILY MEDICINE

## 2018-07-25 PROCEDURE — G8400 PT W/DXA NO RESULTS DOC: HCPCS | Performed by: FAMILY MEDICINE

## 2018-07-25 PROCEDURE — 3017F COLORECTAL CA SCREEN DOC REV: CPT | Performed by: FAMILY MEDICINE

## 2018-07-25 PROCEDURE — 1090F PRES/ABSN URINE INCON ASSESS: CPT | Performed by: FAMILY MEDICINE

## 2018-07-25 PROCEDURE — G8417 CALC BMI ABV UP PARAM F/U: HCPCS | Performed by: FAMILY MEDICINE

## 2018-07-25 PROCEDURE — 1036F TOBACCO NON-USER: CPT | Performed by: FAMILY MEDICINE

## 2018-07-25 PROCEDURE — 1101F PT FALLS ASSESS-DOCD LE1/YR: CPT | Performed by: FAMILY MEDICINE

## 2018-07-25 ASSESSMENT — PATIENT HEALTH QUESTIONNAIRE - PHQ9
1. LITTLE INTEREST OR PLEASURE IN DOING THINGS: 0
2. FEELING DOWN, DEPRESSED OR HOPELESS: 0
SUM OF ALL RESPONSES TO PHQ QUESTIONS 1-9: 0
SUM OF ALL RESPONSES TO PHQ9 QUESTIONS 1 & 2: 0

## 2018-07-25 ASSESSMENT — ENCOUNTER SYMPTOMS: DIARRHEA: 1

## 2018-07-25 NOTE — PROGRESS NOTES
Subjective:      Patient ID: Raguel Felty is a 74 y.o. female. HPI Raguel Felty is a 74 y.o. White female. [facility]  presents to the [address] [place] today for   Chief Complaint   Patient presents with    Follow-up     nori ly    Discuss Medications     stop some due to not feeling well    Diarrhea     partial colectomy 2009    Urinary Frequency     odor from B vitamin she things    Dizziness     allopurinol she thinks,     Anxiety     allopurinol she thinks    Leg Swelling     after she stopped B vitamin and allopurinol   ,  and;   1. Diastolic dysfunction    2. Breathlessness on exertion    3. Edema extremities    4. Morbid obesity ([address]    5. Obstructive apnea    6. Cyst of vulva    7. Elevated IgE level    8. Food intolerance    9. Generalized edema    10. Rash    11. Itching    12. Hand dysfunction    13. Acute idiopathic gout, unspecified site    14. Right knee pain, unspecified chronicity    15. High glucose      I have reviewed Raguel Felty medical, surgical and other pertinent history in detail, and have updated medication and allergy information in the computerized patient record. Clinical Care Team:     -Referring Provider for today's consult: Self Referred  -Primary Care Provider: Anton Larry MD    Medical/Surgical History:   She  has a past medical history of Acid reflux disease; Anemia; Asthma; Gout; HTN (hypertension); Irregular heart rate; and Obstructive sleep apnea. Her  has a past surgical history that includes Tonsillectomy (1955); Tubal ligation (1980); Dilation and curettage of uterus (11/08/1985); Facial cosmetic surgery (1990); Endometrial biopsy (06/14/1995); knee surgery (Right, 05/1997); other surgical history (Left, 11/2007); colectomy (10/15/2009); other surgical history (12/17/1995); and Hysterectomy, total abdominal (01/2005).     Family/Social History:     Her family history includes Cancer in her mother; Heart Disease in her father, mother, and sister; High Blood Pressure in her father and mother; Kidney Disease in her mother. She  reports that she is a non-smoker but has been exposed to tobacco smoke. She has never used smokeless tobacco. She reports that she does not drink alcohol or use drugs. Medications/Allergies/Immunizations:     Her current medication(s) include   Current Outpatient Prescriptions:     betamethasone, augmented, (DIPROLENE) 0.05 % gel, Apply topically 2 times daily , Disp: , Rfl:     triamcinolone (KENALOG) 0.1 % cream, Apply topically as needed , Disp: , Rfl:     albuterol sulfate  (90 Base) MCG/ACT inhaler, Inhale 2 puffs into the lungs as needed for Wheezing, Disp: , Rfl:     diltiazem (DILACOR XR) 180 MG extended release capsule, Take 180 mg by mouth daily, Disp: , Rfl:     nebivolol (BYSTOLIC) 5 MG tablet, Take 7.5 mg by mouth daily , Disp: , Rfl:     lisinopril (PRINIVIL;ZESTRIL) 20 MG tablet, Take 20 mg by mouth daily, Disp: , Rfl:     Levomefolic Acid (5-MTHF PO), Take 5 mg by mouth every morning (before breakfast) , Disp: , Rfl:     Cholecalciferol (VITAMIN D3) 5000 UNITS CAPS, Take 5,000 Units by mouth every morning (before breakfast) , Disp: , Rfl:   Allergies: Alternaria; Clindamycin/lincomycin; Clonazepam; Dandelion [taraxacum officinale]; Dust mite extract; Epinephrine; Erythrocin [erythromycin]; Lansoprazole; Molds & smuts; Penicillins; Seasonal; Sulfa antibiotics; and Tetracyclines & related,  Immunizations: There is no immunization history on file for this patient. History of Present Illness:     Mattie's had concerns including Follow-up (nori ly); Discuss Medications (stop some due to not feeling well); Diarrhea (partial colectomy 2009); Urinary Frequency (odor from B vitamin she things); Dizziness (allopurinol she thinks, ); Anxiety (allopurinol she thinks); and Leg Swelling (after she stopped B vitamin and allopurinol). Jeffy Cook  presents to the [facility] today for;   Chief Complaint   Patient presents with    Follow-up     nori ly    Discuss Medications     stop some due to not feeling well    Diarrhea     partial colectomy 2009    Urinary Frequency     odor from B vitamin she things    Dizziness     allopurinol she thinks,     Anxiety     allopurinol she thinks    Leg Swelling     after she stopped B vitamin and allopurinol   , ,  abnormal labs follow up and these conditions as she  Is looking today for:     1. Diastolic dysfunction    2. Breathlessness on exertion    3. Edema extremities    4. Morbid obesity ([address]    5. Obstructive apnea    6. Cyst of vulva    7. Elevated IgE level    8. Food intolerance    9. Generalized edema    10. Rash    11. Itching    12. Hand dysfunction    13. Acute idiopathic gout, unspecified site    14. Right knee pain, unspecified chronicity    15. High glucose            Review of Systems   Gastrointestinal: Positive for diarrhea. Genitourinary:        Urine odor   All other systems reviewed and are negative. Objective:   Physical Exam   Constitutional: She is oriented to person, place, and time. She appears well-developed and well-nourished. HENT:   Head: Normocephalic. Pulmonary/Chest: Effort normal.   Neurological: She is alert and oriented to person, place, and time. Psychiatric: She has a normal mood and affect. Thought content normal.   Nursing note and vitals reviewed. Assessment:     Laboratory Data:   Lab results were searched in Care Everywhere and/or those brought by the pateint were reviewed today with Ольга Law and she has a copy of their most recent labs to take home with them as noted below;       Imaging Data:   Imaging Data:       Assessment & Plan:       Impression:  1. Diastolic dysfunction    2. Breathlessness on exertion    3. Edema extremities    4. Morbid obesity ([address]    5. Obstructive apnea    6. Cyst of vulva    7. Elevated IgE level    8. Food intolerance    9. Generalized edema    10. Rash    11. Itching    12. Hand dysfunction    13. Acute idiopathic gout, unspecified site    14. Right knee pain, unspecified chronicity    15. High glucose      Assessment and Plan:  After reviewing the patients chief complaints, reviewing their lab findings in great detail (with the patient and those accompanying them) which correlate to their chief complaints, symptoms, and or medical conditions; suggestions were made relating to changes in diet and or supplements which may improve the complaints and which will be reflected in their future lab findings; Chief Complaint   Patient presents with    Follow-up     nori ly    Discuss Medications     stop some due to not feeling well    Diarrhea     partial colectomy 2009    Urinary Frequency     odor from B vitamin she things    Dizziness     allopurinol she thinks,     Anxiety     allopurinol she thinks    Leg Swelling     after she stopped B vitamin and allopurinol   ;    Plans for the next visits:  - Abnormal and non-optimal Labs were ordered today to be repeated in the next 120-365 days to assess changes from adjustments in nutrition and or nutrients. - Patient instructed when having a blood draw to ask the  to divide their lab draws into multiple draws over several days if not feeling good at the time of the lab draw or if either prefers to do several smaller blood draws over several days  - Patient instructed to check with insurer before each lab draw and to go to the lab which the insurer directs them for the most cost effective lab draw with the least patient's cost  - Lisa Bess  will be scheduled subsequent to those results. Lillian Maynor will bring in her drink and food log to her next visit    Chronic Problems Addressed on this Visit:                                   1.  Intensity of Service; Uncontrolled items at this visit;   Chief Complaint   Patient presents with    Follow-up     nori ly    Discuss chains such as [employer], [facility]. Giant Eaglehave;  - Triple Strength Fish Oil (enteric coated if available) or    If not enteric coated, can take from freezer for less burps  - B-50 or B-100 time released balanced B complex tabs not containing Vit C in tablet  - Cinnamon bark 500 mg (with Chromium but not extracts)   some brands list 1000 mg / serving of 2 500 mg capsules,    some brands have 1000 mg caps with the chromium but capsule size is huge  - Calcium carbonate/citrate, magnesium oxide/citrate, Vit D 3  as 3-4 tabs/caps/serving     Some Local Brands may contain Zinc which is acceptable for the first bottle or two  - Magnesium oxide 250 mg tabs for those having < 2 bowel movements daily  - Magnesium citrate TABLETS  or Slow Mag, or magnesium gluconate if having > 2 bowel movement/day  - Centrum Silver or look-a-like for most patients, Centrum plain or look-a-like with iron  - Vitamin D-3 comes as 1,000 IU or 2,000 IU or 5,000 IU gel caps or Liquid drops      Some brands containing or derived from soy oil or corn oil are OK if not allergic to soy  - Elemental Iron 65 mg tabs at bedtime is available over the counter if need more iron     Usually turns bowel movements grey, green or black but not a concern  - Apricot Kernel Oil (by Now) for dry skin and use in sensitive perineal area skin    Nutrients for ongoing use by Mail order for less expense from www.amazon. com, wwwLegCyte, www.RingMD   - Triple Strength Fish Oil , Softgels   - B-100 time released balanced B complex   - Cinnamon bark 500 mg with or without Chromium but not extract (ineffective)  - Calcium carbonate 1000 mg, Magnesium oxide 500 mg, Vit D 3 best as individual  - Magnesium oxide 250 mg, 400 mg, or 500 mg tabs if < 2 bowel movements daily  - Multivitamin Seniors for most patients, One Daily or Item with iron  - Vit D 3  1,000IU ,   2,000 IU,  5,000 IU, can start low and buy larger on 2nd bottle     Some brands containing or derived from soy oil or corn oil are OK if not allergic to soy    Nutrients for Special Needs by Mail order for less expense;  - Elemental Iron 65 mg tabs if need more iron for low iron on labs    Usually turns bowel movements grey, green or black but not a concern  - Time released Niacin 250 mg for cold intolerance, low libido or impotence  - DHEA 10 mg, 25 mg, or 50 mg for improving DHEA levels on labs if having Fatigue    If stools too loose substitute for your Magnesium oxide using;   Magnesium citrate 200 mg tabs(NOT liquid, NOT caps) amazon. com  Magnesium gluconate 550 mg by Edil at [facility] [employer]  Magnesium chloride foot soaks or body sprays  www.fabrooms. SocialDial   Magnesium chloride flakes for soaks, or magnesium spray or cream    Food Drink Symptom Log;  I asked this patient to track these items and any other symptoms on their list on a weekly basis to document their progress or lack of same. This can be done on the symptom tracking sheet available to them at today's visit but looks like this:                                                      Rate on scale of 0-10 with zero = not noticeable  Symptom:                            Week 1               2                 3                 4               Etc            Hair loss    Foot cramps    Paresthesia    Aches    IBS (irritable bowel)    Constipation    Diarrhea  Nocturia    (up to bathroom at night)    Fatigue/Energy level    Stress      On the other side of the sheet they can track their food, drink, environment, activity, symptoms etc      Avoiding Latex-like proteins in my foods; Avocados, Bananas, Celery, Figs & Kiwi proteins have latex-like proteins to inflame our immune systems, see the 51 latex-like foods list  How Can I Have A Latex Allergy? Eating foods with latex-like protein exposes us to latex allergies.   Our body cannot tell the difference between these latex-like proteins and latex from rubber products since many people blueberries, cabbage, cantaloupe, carrots, cauliflower, celery, cucumbers, eggplant, grapes, green onions, greens, lettuce, onions, parsley, peas, peaches, pears, bell peppers, plums, potatoes, pumpkins, radishes, fall red raspberries, squash, sweet corn, tomatoes, turnips, watermelons  October; apples, beets, broccoli, cabbage, carrots, cauliflower, celery, green onions, greens, lettuce, parsley, peas, pears, potatoes, pumpkins, radishes, fall red raspberries, squash, turnips  November; broccoli, cabbage, carrots, parsley, pears, peas  December: use canned, frozen or dried fruits since lower in latex    Up to half of latex-sensitive patients show allergic reactions to fruits (avocados, bananas, kiwifruits, papayas, peaches),   Annals of Allergy, 1994. These plants contain the same proteins that are allergens in latex. People with fruit allergies should warn physicians before undergoing procedures which may cause anaphylactic reaction if in contact with latex gloves. Some of the common foods with defined cross-reactivity to latex are avocado, banana, kiwi, chestnut, raw potato, tomato, stone fruits (e.g., peach, cherry), hazelnut, melons, celery, carrot, apple, pear, papaya, and almond. Foods with less well-defined cross-reactivity to latex are peanuts, peppers, citrus fruits, coconut, pineapple, so, fig, passion fruit, Ugli fruit, and grape    This fruit/latex cross-reactivity is worsened by ethylene, a gas used to hasten commercial ripening. In nature, plants produce low levels of the hormone ethylene, which regulates germination, flowering, and ripening. Forced ripening by high ethylene concentrations, plants produce allergenic wound-repair proteins, which are similar to wound-repair proteins made during the tapping of rubber trees. Sensitive individuals who ingest the fruit get a higher dose and worse reaction. Some people may even first become sensitized to latex through fruit.   Can food processing

## 2018-10-25 ENCOUNTER — OFFICE VISIT (OUTPATIENT)
Dept: FAMILY MEDICINE CLINIC | Age: 73
End: 2018-10-25
Payer: MEDICARE

## 2018-10-25 VITALS
BODY MASS INDEX: 48.41 KG/M2 | TEMPERATURE: 97.6 F | HEIGHT: 61 IN | SYSTOLIC BLOOD PRESSURE: 138 MMHG | DIASTOLIC BLOOD PRESSURE: 78 MMHG | WEIGHT: 256.4 LBS | RESPIRATION RATE: 10 BRPM

## 2018-10-25 DIAGNOSIS — M10.00 ACUTE IDIOPATHIC GOUT, UNSPECIFIED SITE: ICD-10-CM

## 2018-10-25 DIAGNOSIS — R21 RASH: ICD-10-CM

## 2018-10-25 DIAGNOSIS — N90.7 CYST OF VULVA: ICD-10-CM

## 2018-10-25 DIAGNOSIS — G47.33 OBSTRUCTIVE APNEA: ICD-10-CM

## 2018-10-25 DIAGNOSIS — L29.9 ITCHING: ICD-10-CM

## 2018-10-25 DIAGNOSIS — I51.89 DIASTOLIC DYSFUNCTION: ICD-10-CM

## 2018-10-25 DIAGNOSIS — R29.898 HAND DYSFUNCTION: ICD-10-CM

## 2018-10-25 DIAGNOSIS — M25.561 RIGHT KNEE PAIN, UNSPECIFIED CHRONICITY: ICD-10-CM

## 2018-10-25 DIAGNOSIS — R06.81 BREATHLESSNESS ON EXERTION: ICD-10-CM

## 2018-10-25 DIAGNOSIS — R73.09 HIGH GLUCOSE: ICD-10-CM

## 2018-10-25 DIAGNOSIS — R60.0 EDEMA EXTREMITIES: ICD-10-CM

## 2018-10-25 DIAGNOSIS — K90.49 FOOD INTOLERANCE: ICD-10-CM

## 2018-10-25 DIAGNOSIS — R76.8 ELEVATED IGE LEVEL: ICD-10-CM

## 2018-10-25 DIAGNOSIS — R60.1 GENERALIZED EDEMA: ICD-10-CM

## 2018-10-25 DIAGNOSIS — E66.01 MORBID OBESITY (HCC): ICD-10-CM

## 2018-10-25 PROCEDURE — 99214 OFFICE O/P EST MOD 30 MIN: CPT | Performed by: FAMILY MEDICINE

## 2018-10-25 PROCEDURE — G8400 PT W/DXA NO RESULTS DOC: HCPCS | Performed by: FAMILY MEDICINE

## 2018-10-25 PROCEDURE — G8417 CALC BMI ABV UP PARAM F/U: HCPCS | Performed by: FAMILY MEDICINE

## 2018-10-25 PROCEDURE — 3017F COLORECTAL CA SCREEN DOC REV: CPT | Performed by: FAMILY MEDICINE

## 2018-10-25 PROCEDURE — 4040F PNEUMOC VAC/ADMIN/RCVD: CPT | Performed by: FAMILY MEDICINE

## 2018-10-25 PROCEDURE — 1090F PRES/ABSN URINE INCON ASSESS: CPT | Performed by: FAMILY MEDICINE

## 2018-10-25 PROCEDURE — G8427 DOCREV CUR MEDS BY ELIG CLIN: HCPCS | Performed by: FAMILY MEDICINE

## 2018-10-25 PROCEDURE — 1036F TOBACCO NON-USER: CPT | Performed by: FAMILY MEDICINE

## 2018-10-25 PROCEDURE — G8484 FLU IMMUNIZE NO ADMIN: HCPCS | Performed by: FAMILY MEDICINE

## 2018-10-25 PROCEDURE — 1101F PT FALLS ASSESS-DOCD LE1/YR: CPT | Performed by: FAMILY MEDICINE

## 2018-10-25 PROCEDURE — 1123F ACP DISCUSS/DSCN MKR DOCD: CPT | Performed by: FAMILY MEDICINE

## 2018-10-25 RX ORDER — CHLORAL HYDRATE 500 MG
1 CAPSULE ORAL
COMMUNITY

## 2018-10-25 ASSESSMENT — ENCOUNTER SYMPTOMS: ABDOMINAL DISTENTION: 1

## 2018-10-25 NOTE — PROGRESS NOTES
list to avoid         - Gluten in corn and oats abstracts sheet reviewed and given to the patient today   3. Greater than 25 minutes were spent face to face on this visit of which >50% was for counseling and coordination of care. Patient's foods, drinks and supplements were reviewed with the patient,   - they will bring a food drink symptom log to future visits for inclusion in their record    - 75 better food list reviewed & given to patient along with the omega 6 food list to avoid      - Gluten in corn and oats abstracts sheet reviewed and given to the patient today    - 51 Foods containing Latex-like proteins was reviewed and copy given to the patient     - Nutrient Supplements list provided and copy given to the patient     - [employer]. [employer] web site offered to patient to review at their convenience by staff with login information    - www.efaeducation. org site reviewed with the patient so they can identify better foods    - www.cornicopia. org site reviewed with the patient so they can reduce carrageenan by reviewing the carrageenan buyers guide on that site and picking safer foods    Note:   I have discussed with the patient that with all nutraceuticals, there is often mixed data and emerging research which needs to be monitored; as well as an array of NIH fact sheets on nutrients and supplements. If I have recommended cinnamon at the request of this patient to assist them in control of their blood sugar, triglyceride and or weight issues. I discussed that the patient's clinical use of cinnamon bark, calcium, magnesium, Vitamin D and pharmaceutical grade [employer]S #[id number] fish oil or triple-strength fish oil, and balanced B-50 or B-100 time-released B complex which does not contain Vit C in the tablet. The dose will be for a time-limited trial to determine their individual effectiveness and tolerance in this patient.   I also referred the patient to the reviewing such items as

## 2018-10-25 NOTE — PATIENT INSTRUCTIONS
1. You may receive a survey about your visit with us today. The feedback from our patients helps us identify what is working well and where the service to all patients can be enhanced. Thank you! 2. Please bring in ALL medications BOTTLES, including inhalers, herbal supplements, over the counter, prescribed & non-prescribed medicine. The office would like actual medication bottles and a list.  3. Please note our OFFICE POLICIES:  a. Prior to getting your labs drawn, please check with your insurance company for benefits and eligibility of lab services. Often, insurance companies cover certain tests for preventative visits only. It is patient's responsibility to see what is covered. b. We are unable to change a diagnosis after the test has been performed. c. Lab orders will not be re-printed. Please hold onto your original lab orders and take them to your lab to be completed. d. If you no show your schedule appointment three times, you be dismissed from this practice. e. Lizzy Mine must be completed 24 hours prior to your schedule appointment. 4. If the list below has been completed, PLEASE FAX RECORDS TO OUR OFFICE @ [phone]. Once the records have been received we will update your records at our office. Health Maintenance Due   Topic Date Due    DTaP/Tdap/Td vaccine (1 - Tdap) 11/20/1962    Shingles Vaccine (1 of 2 - 2 Dose Series) 11/20/1993    Colon cancer screen colonoscopy  11/20/1993    Pneumococcal low/med risk (1 of 2 - PCV13) 11/20/2008    Potassium monitoring  08/17/2018    Creatinine monitoring  08/17/2018    Flu vaccine (1) 09/01/2018       Schedule your 2018 flu vaccine with Dr. Juancarlos Canela call [phone]!   [facility], for anyone 9 years or older   [address]   Every Monday   8:00am-11:00am and 1:00pm-3:00pm

## 2019-04-18 ENCOUNTER — TELEPHONE (OUTPATIENT)
Dept: FAMILY MEDICINE CLINIC | Age: 74
End: 2019-04-18

## 2019-04-18 NOTE — TELEPHONE ENCOUNTER
Pt called, asked why she cannot see her lab results in [facility]? Asked where she had them done? She said [facility]. Advised we do not have enough personnel to put the individual lab results when done at a different location other than [facility] ([facility]). When she gets them done at a [facility]) facility they drop in automatically since electronically connected. She said when she tried to go to the [facility] they told her they don't draw them there any more. I advised they do draw labs there, maybe they were short staffed or it is possible the equipment was out for maintenance. Advised she can call there and make sure they are open and get them drawn. She voiced understanding.